# Patient Record
Sex: MALE | Race: BLACK OR AFRICAN AMERICAN | NOT HISPANIC OR LATINO | URBAN - METROPOLITAN AREA
[De-identification: names, ages, dates, MRNs, and addresses within clinical notes are randomized per-mention and may not be internally consistent; named-entity substitution may affect disease eponyms.]

---

## 2017-04-14 ENCOUNTER — EMERGENCY (EMERGENCY)
Facility: HOSPITAL | Age: 52
LOS: 1 days | Discharge: HOME | End: 2017-04-14
Attending: EMERGENCY MEDICINE | Admitting: EMERGENCY MEDICINE
Payer: OTHER MISCELLANEOUS

## 2017-04-14 VITALS
DIASTOLIC BLOOD PRESSURE: 87 MMHG | TEMPERATURE: 98 F | HEART RATE: 84 BPM | SYSTOLIC BLOOD PRESSURE: 145 MMHG | RESPIRATION RATE: 18 BRPM | OXYGEN SATURATION: 97 %

## 2017-04-14 PROCEDURE — 99284 EMERGENCY DEPT VISIT MOD MDM: CPT

## 2017-04-14 PROCEDURE — 73552 X-RAY EXAM OF FEMUR 2/>: CPT | Mod: 26,50

## 2017-04-14 PROCEDURE — 73521 X-RAY EXAM HIPS BI 2 VIEWS: CPT | Mod: 26

## 2017-04-14 PROCEDURE — 73564 X-RAY EXAM KNEE 4 OR MORE: CPT | Mod: 26,50

## 2017-04-14 NOTE — ED PROVIDER NOTE - PROGRESS NOTE DETAILS
MAXIMILIANO Mckay:(QUID PA) pt feels better ambulating using walker without difficulty.  Results reviewed with patient.  Ace wraps applied.  X-rays negative and reviewed with patient.  Discharge reviewed and discussed with patient.

## 2017-04-14 NOTE — ED PROVIDER NOTE - NS ED MD SCRIBE ATTENDING SCRIBE SECTIONS
HIV/PAST MEDICAL/SURGICAL/SOCIAL HISTORY/PHYSICAL EXAM/HISTORY OF PRESENT ILLNESS/VITAL SIGNS( Pullset)/REVIEW OF SYSTEMS/DISPOSITION

## 2017-04-14 NOTE — ED PROVIDER NOTE - PLAN OF CARE
Follow up with Orthopedics in 2-3 days.(see attached list).  Rest.  Ice 3-4 x a day.  Elevate knees. Ace wrap for support.  Use walker to ambulate.  Continue to take Motrin as prescribed by your doctor take with food.    Return to the ER for any persistent/worsening or new symptoms weakness, numbness or any concerning symptoms.

## 2017-04-14 NOTE — ED PROVIDER NOTE - OBJECTIVE STATEMENT
52 y/o M w/ no significant PMHx, presents to the ED c/o b/l knee pain and swelling. Pt states he fell last Wed (mechanical fall), brought to a hospital in [facility], dx w/ muscle strain in thighs, Rx Ibuprofen and walker w/ no relief. Pt did not get blood work or XR done. Pt notes he has been falling since he was discharged due to his knees giving out. Denies any other complaints. NKDA.

## 2017-04-14 NOTE — ED PROVIDER NOTE - CARE PLAN
Principal Discharge DX:	Knee pain, bilateral  Instructions for follow-up, activity and diet:	Follow up with Orthopedics in 2-3 days.(see attached list).  Rest.  Ice 3-4 x a day.  Elevate knees. Ace wrap for support.  Use walker to ambulate.  Continue to take Motrin as prescribed by your doctor take with food.    Return to the ER for any persistent/worsening or new symptoms weakness, numbness or any concerning symptoms.

## 2017-04-14 NOTE — ED ADULT TRIAGE NOTE - CHIEF COMPLAINT QUOTE
Pt states he fell several times (reports mechanical falls), went to another hospital and states they didn't evaluate him properly, was told he had muscle strain in his thighs, sent home with a walker and Ibuprofen Rx, now c/o swelling to both knees, with pain. Denies PMH

## 2017-07-22 ENCOUNTER — INPATIENT (INPATIENT)
Facility: HOSPITAL | Age: 52
LOS: 2 days | Discharge: HOME | DRG: 247 | End: 2017-07-25
Attending: STUDENT IN AN ORGANIZED HEALTH CARE EDUCATION/TRAINING PROGRAM | Admitting: STUDENT IN AN ORGANIZED HEALTH CARE EDUCATION/TRAINING PROGRAM
Payer: COMMERCIAL

## 2017-07-22 VITALS — WEIGHT: 220.46 LBS

## 2017-07-22 DIAGNOSIS — I21.3 ST ELEVATION (STEMI) MYOCARDIAL INFARCTION OF UNSPECIFIED SITE: ICD-10-CM

## 2017-07-22 DIAGNOSIS — F17.200 NICOTINE DEPENDENCE, UNSPECIFIED, UNCOMPLICATED: ICD-10-CM

## 2017-07-22 LAB
ALBUMIN SERPL ELPH-MCNC: 4.3 G/DL — SIGNIFICANT CHANGE UP (ref 3.3–5)
ALBUMIN SERPL ELPH-MCNC: 4.4 G/DL — SIGNIFICANT CHANGE UP (ref 3.3–5)
ALBUMIN SERPL ELPH-MCNC: 4.5 G/DL — SIGNIFICANT CHANGE UP (ref 3.3–5)
ALBUMIN SERPL ELPH-MCNC: 4.7 G/DL — SIGNIFICANT CHANGE UP (ref 3.3–5)
ALP SERPL-CCNC: 67 U/L — SIGNIFICANT CHANGE UP (ref 40–120)
ALP SERPL-CCNC: 72 U/L — SIGNIFICANT CHANGE UP (ref 40–120)
ALP SERPL-CCNC: 73 U/L — SIGNIFICANT CHANGE UP (ref 40–120)
ALP SERPL-CCNC: 75 U/L — SIGNIFICANT CHANGE UP (ref 40–120)
ALT FLD-CCNC: 25 U/L RC — SIGNIFICANT CHANGE UP (ref 10–45)
ALT FLD-CCNC: 30 U/L RC — SIGNIFICANT CHANGE UP (ref 10–45)
ALT FLD-CCNC: 40 U/L RC — SIGNIFICANT CHANGE UP (ref 10–45)
ALT FLD-CCNC: 45 U/L RC — SIGNIFICANT CHANGE UP (ref 10–45)
ANION GAP SERPL CALC-SCNC: 13 MMOL/L — SIGNIFICANT CHANGE UP (ref 5–17)
ANION GAP SERPL CALC-SCNC: 14 MMOL/L — SIGNIFICANT CHANGE UP (ref 5–17)
ANION GAP SERPL CALC-SCNC: 16 MMOL/L — SIGNIFICANT CHANGE UP (ref 5–17)
ANION GAP SERPL CALC-SCNC: 19 MMOL/L — HIGH (ref 5–17)
APTT BLD: 28.1 SEC — SIGNIFICANT CHANGE UP (ref 27.5–37.4)
AST SERPL-CCNC: 133 U/L — HIGH (ref 10–40)
AST SERPL-CCNC: 146 U/L — HIGH (ref 10–40)
AST SERPL-CCNC: 22 U/L — SIGNIFICANT CHANGE UP (ref 10–40)
AST SERPL-CCNC: 66 U/L — HIGH (ref 10–40)
BASOPHILS # BLD AUTO: 0.1 K/UL — SIGNIFICANT CHANGE UP (ref 0–0.2)
BASOPHILS NFR BLD AUTO: 0.8 % — SIGNIFICANT CHANGE UP (ref 0–2)
BILIRUB SERPL-MCNC: 0.4 MG/DL — SIGNIFICANT CHANGE UP (ref 0.2–1.2)
BILIRUB SERPL-MCNC: 0.4 MG/DL — SIGNIFICANT CHANGE UP (ref 0.2–1.2)
BILIRUB SERPL-MCNC: 0.5 MG/DL — SIGNIFICANT CHANGE UP (ref 0.2–1.2)
BILIRUB SERPL-MCNC: 0.7 MG/DL — SIGNIFICANT CHANGE UP (ref 0.2–1.2)
BLD GP AB SCN SERPL QL: NEGATIVE — SIGNIFICANT CHANGE UP
BUN SERPL-MCNC: 11 MG/DL — SIGNIFICANT CHANGE UP (ref 7–23)
BUN SERPL-MCNC: 12 MG/DL — SIGNIFICANT CHANGE UP (ref 7–23)
BUN SERPL-MCNC: 12 MG/DL — SIGNIFICANT CHANGE UP (ref 7–23)
BUN SERPL-MCNC: 13 MG/DL — SIGNIFICANT CHANGE UP (ref 7–23)
CALCIUM SERPL-MCNC: 10.2 MG/DL — SIGNIFICANT CHANGE UP (ref 8.4–10.5)
CALCIUM SERPL-MCNC: 9.6 MG/DL — SIGNIFICANT CHANGE UP (ref 8.4–10.5)
CALCIUM SERPL-MCNC: 9.8 MG/DL — SIGNIFICANT CHANGE UP (ref 8.4–10.5)
CALCIUM SERPL-MCNC: 9.8 MG/DL — SIGNIFICANT CHANGE UP (ref 8.4–10.5)
CHLORIDE SERPL-SCNC: 100 MMOL/L — SIGNIFICANT CHANGE UP (ref 96–108)
CHLORIDE SERPL-SCNC: 102 MMOL/L — SIGNIFICANT CHANGE UP (ref 96–108)
CHLORIDE SERPL-SCNC: 102 MMOL/L — SIGNIFICANT CHANGE UP (ref 96–108)
CHLORIDE SERPL-SCNC: 98 MMOL/L — SIGNIFICANT CHANGE UP (ref 96–108)
CK MB BLD-MCNC: 1.2 % — SIGNIFICANT CHANGE UP (ref 0–3.5)
CK MB BLD-MCNC: 6.9 % — HIGH (ref 0–3.5)
CK MB BLD-MCNC: 8 % — HIGH (ref 0–3.5)
CK MB BLD-MCNC: 8.4 % — HIGH (ref 0–3.5)
CK MB CFR SERPL CALC: 105.9 NG/ML — HIGH (ref 0–6.7)
CK MB CFR SERPL CALC: 139.9 NG/ML — HIGH (ref 0–6.7)
CK MB CFR SERPL CALC: 2 NG/ML — SIGNIFICANT CHANGE UP (ref 0–6.7)
CK MB CFR SERPL CALC: 74.8 NG/ML — HIGH (ref 0–6.7)
CK SERPL-CCNC: 1545 U/L — HIGH (ref 30–200)
CK SERPL-CCNC: 161 U/L — SIGNIFICANT CHANGE UP (ref 30–200)
CK SERPL-CCNC: 1663 U/L — HIGH (ref 30–200)
CK SERPL-CCNC: 932 U/L — HIGH (ref 30–200)
CO2 SERPL-SCNC: 22 MMOL/L — SIGNIFICANT CHANGE UP (ref 22–31)
CO2 SERPL-SCNC: 24 MMOL/L — SIGNIFICANT CHANGE UP (ref 22–31)
CO2 SERPL-SCNC: 25 MMOL/L — SIGNIFICANT CHANGE UP (ref 22–31)
CO2 SERPL-SCNC: 25 MMOL/L — SIGNIFICANT CHANGE UP (ref 22–31)
CREAT SERPL-MCNC: 0.72 MG/DL — SIGNIFICANT CHANGE UP (ref 0.5–1.3)
CREAT SERPL-MCNC: 0.86 MG/DL — SIGNIFICANT CHANGE UP (ref 0.5–1.3)
CREAT SERPL-MCNC: 0.86 MG/DL — SIGNIFICANT CHANGE UP (ref 0.5–1.3)
CREAT SERPL-MCNC: 0.93 MG/DL — SIGNIFICANT CHANGE UP (ref 0.5–1.3)
EOSINOPHIL # BLD AUTO: 0.2 K/UL — SIGNIFICANT CHANGE UP (ref 0–0.5)
EOSINOPHIL NFR BLD AUTO: 1 % — SIGNIFICANT CHANGE UP (ref 0–6)
GAS PNL BLDV: SIGNIFICANT CHANGE UP
GLUCOSE SERPL-MCNC: 109 MG/DL — HIGH (ref 70–99)
GLUCOSE SERPL-MCNC: 135 MG/DL — HIGH (ref 70–99)
GLUCOSE SERPL-MCNC: 149 MG/DL — HIGH (ref 70–99)
GLUCOSE SERPL-MCNC: 201 MG/DL — HIGH (ref 70–99)
HCT VFR BLD CALC: 46.2 % — SIGNIFICANT CHANGE UP (ref 39–50)
HCT VFR BLD CALC: 48.3 % — SIGNIFICANT CHANGE UP (ref 39–50)
HGB BLD-MCNC: 15.1 G/DL — SIGNIFICANT CHANGE UP (ref 13–17)
HGB BLD-MCNC: 15.9 G/DL — SIGNIFICANT CHANGE UP (ref 13–17)
INR BLD: 1.07 RATIO — SIGNIFICANT CHANGE UP (ref 0.88–1.16)
INR BLD: 1.17 RATIO — HIGH (ref 0.88–1.16)
LACTATE SERPL-SCNC: 2 MMOL/L — SIGNIFICANT CHANGE UP (ref 0.7–2)
LYMPHOCYTES # BLD AUTO: 20 % — SIGNIFICANT CHANGE UP (ref 13–44)
LYMPHOCYTES # BLD AUTO: 3.1 K/UL — SIGNIFICANT CHANGE UP (ref 1–3.3)
MAGNESIUM SERPL-MCNC: 1.8 MG/DL — SIGNIFICANT CHANGE UP (ref 1.6–2.6)
MAGNESIUM SERPL-MCNC: 2.1 MG/DL — SIGNIFICANT CHANGE UP (ref 1.6–2.6)
MAGNESIUM SERPL-MCNC: 2.2 MG/DL — SIGNIFICANT CHANGE UP (ref 1.6–2.6)
MCHC RBC-ENTMCNC: 31.4 PG — SIGNIFICANT CHANGE UP (ref 27–34)
MCHC RBC-ENTMCNC: 31.7 PG — SIGNIFICANT CHANGE UP (ref 27–34)
MCHC RBC-ENTMCNC: 32.5 GM/DL — SIGNIFICANT CHANGE UP (ref 32–36)
MCHC RBC-ENTMCNC: 33 GM/DL — SIGNIFICANT CHANGE UP (ref 32–36)
MCV RBC AUTO: 96.1 FL — SIGNIFICANT CHANGE UP (ref 80–100)
MCV RBC AUTO: 96.6 FL — SIGNIFICANT CHANGE UP (ref 80–100)
MONOCYTES # BLD AUTO: 0.8 K/UL — SIGNIFICANT CHANGE UP (ref 0–0.9)
MONOCYTES NFR BLD AUTO: 5.4 % — SIGNIFICANT CHANGE UP (ref 2–14)
NEUTROPHILS # BLD AUTO: 11.2 K/UL — HIGH (ref 1.8–7.4)
NEUTROPHILS NFR BLD AUTO: 72.9 % — SIGNIFICANT CHANGE UP (ref 43–77)
NT-PROBNP SERPL-SCNC: 10 PG/ML — SIGNIFICANT CHANGE UP (ref 0–300)
PHOSPHATE SERPL-MCNC: 2.7 MG/DL — SIGNIFICANT CHANGE UP (ref 2.5–4.5)
PLATELET # BLD AUTO: 260 K/UL — SIGNIFICANT CHANGE UP (ref 150–400)
PLATELET # BLD AUTO: 267 K/UL — SIGNIFICANT CHANGE UP (ref 150–400)
POTASSIUM SERPL-MCNC: 3.8 MMOL/L — SIGNIFICANT CHANGE UP (ref 3.5–5.3)
POTASSIUM SERPL-MCNC: 3.9 MMOL/L — SIGNIFICANT CHANGE UP (ref 3.5–5.3)
POTASSIUM SERPL-MCNC: 4.1 MMOL/L — SIGNIFICANT CHANGE UP (ref 3.5–5.3)
POTASSIUM SERPL-MCNC: 4.2 MMOL/L — SIGNIFICANT CHANGE UP (ref 3.5–5.3)
POTASSIUM SERPL-SCNC: 3.8 MMOL/L — SIGNIFICANT CHANGE UP (ref 3.5–5.3)
POTASSIUM SERPL-SCNC: 3.9 MMOL/L — SIGNIFICANT CHANGE UP (ref 3.5–5.3)
POTASSIUM SERPL-SCNC: 4.1 MMOL/L — SIGNIFICANT CHANGE UP (ref 3.5–5.3)
POTASSIUM SERPL-SCNC: 4.2 MMOL/L — SIGNIFICANT CHANGE UP (ref 3.5–5.3)
PROT SERPL-MCNC: 7.9 G/DL — SIGNIFICANT CHANGE UP (ref 6–8.3)
PROT SERPL-MCNC: 8.1 G/DL — SIGNIFICANT CHANGE UP (ref 6–8.3)
PROT SERPL-MCNC: 8.2 G/DL — SIGNIFICANT CHANGE UP (ref 6–8.3)
PROT SERPL-MCNC: 8.5 G/DL — HIGH (ref 6–8.3)
PROTHROM AB SERPL-ACNC: 11.6 SEC — SIGNIFICANT CHANGE UP (ref 9.8–12.7)
PROTHROM AB SERPL-ACNC: 12.7 SEC — SIGNIFICANT CHANGE UP (ref 9.8–12.7)
RBC # BLD: 4.79 M/UL — SIGNIFICANT CHANGE UP (ref 4.2–5.8)
RBC # BLD: 5.02 M/UL — SIGNIFICANT CHANGE UP (ref 4.2–5.8)
RBC # FLD: 13.2 % — SIGNIFICANT CHANGE UP (ref 10.3–14.5)
RBC # FLD: 13.2 % — SIGNIFICANT CHANGE UP (ref 10.3–14.5)
RH IG SCN BLD-IMP: POSITIVE — SIGNIFICANT CHANGE UP
SODIUM SERPL-SCNC: 139 MMOL/L — SIGNIFICANT CHANGE UP (ref 135–145)
SODIUM SERPL-SCNC: 140 MMOL/L — SIGNIFICANT CHANGE UP (ref 135–145)
SODIUM SERPL-SCNC: 140 MMOL/L — SIGNIFICANT CHANGE UP (ref 135–145)
SODIUM SERPL-SCNC: 141 MMOL/L — SIGNIFICANT CHANGE UP (ref 135–145)
TROPONIN T SERPL-MCNC: 0.72 NG/ML — HIGH (ref 0–0.06)
TROPONIN T SERPL-MCNC: 1.92 NG/ML — HIGH (ref 0–0.06)
TROPONIN T SERPL-MCNC: 2.5 NG/ML — HIGH (ref 0–0.06)
TROPONIN T SERPL-MCNC: <0.01 NG/ML — SIGNIFICANT CHANGE UP (ref 0–0.06)
WBC # BLD: 12.4 K/UL — HIGH (ref 3.8–10.5)
WBC # BLD: 15.4 K/UL — HIGH (ref 3.8–10.5)
WBC # FLD AUTO: 12.4 K/UL — HIGH (ref 3.8–10.5)
WBC # FLD AUTO: 15.4 K/UL — HIGH (ref 3.8–10.5)

## 2017-07-22 PROCEDURE — 99285 EMERGENCY DEPT VISIT HI MDM: CPT | Mod: 25

## 2017-07-22 PROCEDURE — 93306 TTE W/DOPPLER COMPLETE: CPT | Mod: 26

## 2017-07-22 PROCEDURE — 71010: CPT | Mod: 26

## 2017-07-22 PROCEDURE — 93010 ELECTROCARDIOGRAM REPORT: CPT

## 2017-07-22 PROCEDURE — 99291 CRITICAL CARE FIRST HOUR: CPT

## 2017-07-22 RX ORDER — METOPROLOL TARTRATE 50 MG
25 TABLET ORAL
Qty: 0 | Refills: 0 | Status: DISCONTINUED | OUTPATIENT
Start: 2017-07-22 | End: 2017-07-23

## 2017-07-22 RX ORDER — SIMETHICONE 80 MG/1
80 TABLET, CHEWABLE ORAL ONCE
Qty: 0 | Refills: 0 | Status: COMPLETED | OUTPATIENT
Start: 2017-07-22 | End: 2017-07-22

## 2017-07-22 RX ORDER — MORPHINE SULFATE 50 MG/1
4 CAPSULE, EXTENDED RELEASE ORAL ONCE
Qty: 0 | Refills: 0 | Status: DISCONTINUED | OUTPATIENT
Start: 2017-07-22 | End: 2017-07-22

## 2017-07-22 RX ORDER — LIDOCAINE HCL 20 MG/ML
1 VIAL (ML) INJECTION
Qty: 2 | Refills: 0 | Status: DISCONTINUED | OUTPATIENT
Start: 2017-07-22 | End: 2017-07-22

## 2017-07-22 RX ORDER — POTASSIUM CHLORIDE 20 MEQ
10 PACKET (EA) ORAL ONCE
Qty: 0 | Refills: 0 | Status: COMPLETED | OUTPATIENT
Start: 2017-07-22 | End: 2017-07-22

## 2017-07-22 RX ORDER — TICAGRELOR 90 MG/1
90 TABLET ORAL
Qty: 0 | Refills: 0 | Status: DISCONTINUED | OUTPATIENT
Start: 2017-07-22 | End: 2017-07-25

## 2017-07-22 RX ORDER — ASPIRIN/CALCIUM CARB/MAGNESIUM 324 MG
162 TABLET ORAL ONCE
Qty: 0 | Refills: 0 | Status: COMPLETED | OUTPATIENT
Start: 2017-07-22 | End: 2017-07-22

## 2017-07-22 RX ORDER — MAGNESIUM SULFATE 500 MG/ML
1 VIAL (ML) INJECTION ONCE
Qty: 0 | Refills: 0 | Status: COMPLETED | OUTPATIENT
Start: 2017-07-22 | End: 2017-07-22

## 2017-07-22 RX ORDER — ATORVASTATIN CALCIUM 80 MG/1
80 TABLET, FILM COATED ORAL AT BEDTIME
Qty: 0 | Refills: 0 | Status: DISCONTINUED | OUTPATIENT
Start: 2017-07-22 | End: 2017-07-25

## 2017-07-22 RX ORDER — TICAGRELOR 90 MG/1
180 TABLET ORAL ONCE
Qty: 0 | Refills: 0 | Status: COMPLETED | OUTPATIENT
Start: 2017-07-22 | End: 2017-07-22

## 2017-07-22 RX ORDER — HEPARIN SODIUM 5000 [USP'U]/ML
4000 INJECTION INTRAVENOUS; SUBCUTANEOUS ONCE
Qty: 0 | Refills: 0 | Status: COMPLETED | OUTPATIENT
Start: 2017-07-22 | End: 2017-07-22

## 2017-07-22 RX ORDER — METOPROLOL TARTRATE 50 MG
25 TABLET ORAL ONCE
Qty: 0 | Refills: 0 | Status: COMPLETED | OUTPATIENT
Start: 2017-07-22 | End: 2017-07-22

## 2017-07-22 RX ORDER — NITROGLYCERIN 6.5 MG
0.4 CAPSULE, EXTENDED RELEASE ORAL ONCE
Qty: 0 | Refills: 0 | Status: COMPLETED | OUTPATIENT
Start: 2017-07-22 | End: 2017-07-22

## 2017-07-22 RX ORDER — CAPTOPRIL 12.5 MG/1
6.25 TABLET ORAL DAILY
Qty: 0 | Refills: 0 | Status: DISCONTINUED | OUTPATIENT
Start: 2017-07-22 | End: 2017-07-22

## 2017-07-22 RX ORDER — SODIUM CHLORIDE 9 MG/ML
3 INJECTION INTRAMUSCULAR; INTRAVENOUS; SUBCUTANEOUS ONCE
Qty: 0 | Refills: 0 | Status: COMPLETED | OUTPATIENT
Start: 2017-07-22 | End: 2017-07-22

## 2017-07-22 RX ORDER — LISINOPRIL 2.5 MG/1
5 TABLET ORAL DAILY
Qty: 0 | Refills: 0 | Status: DISCONTINUED | OUTPATIENT
Start: 2017-07-23 | End: 2017-07-25

## 2017-07-22 RX ORDER — HEPARIN SODIUM 5000 [USP'U]/ML
INJECTION INTRAVENOUS; SUBCUTANEOUS
Qty: 25000 | Refills: 0 | Status: DISCONTINUED | OUTPATIENT
Start: 2017-07-22 | End: 2017-07-22

## 2017-07-22 RX ORDER — FUROSEMIDE 40 MG
40 TABLET ORAL ONCE
Qty: 0 | Refills: 0 | Status: COMPLETED | OUTPATIENT
Start: 2017-07-22 | End: 2017-07-22

## 2017-07-22 RX ORDER — ASPIRIN/CALCIUM CARB/MAGNESIUM 324 MG
81 TABLET ORAL DAILY
Qty: 0 | Refills: 0 | Status: DISCONTINUED | OUTPATIENT
Start: 2017-07-22 | End: 2017-07-25

## 2017-07-22 RX ORDER — POTASSIUM CHLORIDE 20 MEQ
20 PACKET (EA) ORAL ONCE
Qty: 0 | Refills: 0 | Status: COMPLETED | OUTPATIENT
Start: 2017-07-22 | End: 2017-07-22

## 2017-07-22 RX ORDER — HEPARIN SODIUM 5000 [USP'U]/ML
4000 INJECTION INTRAVENOUS; SUBCUTANEOUS EVERY 6 HOURS
Qty: 0 | Refills: 0 | Status: DISCONTINUED | OUTPATIENT
Start: 2017-07-22 | End: 2017-07-22

## 2017-07-22 RX ADMIN — Medication 25 MILLIGRAM(S): at 10:17

## 2017-07-22 RX ADMIN — ATORVASTATIN CALCIUM 80 MILLIGRAM(S): 80 TABLET, FILM COATED ORAL at 21:59

## 2017-07-22 RX ADMIN — Medication 7.5 MG/MIN: at 06:15

## 2017-07-22 RX ADMIN — ATORVASTATIN CALCIUM 80 MILLIGRAM(S): 80 TABLET, FILM COATED ORAL at 03:48

## 2017-07-22 RX ADMIN — CAPTOPRIL 6.25 MILLIGRAM(S): 12.5 TABLET ORAL at 05:14

## 2017-07-22 RX ADMIN — Medication 81 MILLIGRAM(S): at 10:17

## 2017-07-22 RX ADMIN — HEPARIN SODIUM 4000 UNIT(S): 5000 INJECTION INTRAVENOUS; SUBCUTANEOUS at 01:15

## 2017-07-22 RX ADMIN — Medication 10 MILLIEQUIVALENT(S): at 12:45

## 2017-07-22 RX ADMIN — Medication 0.4 MILLIGRAM(S): at 01:04

## 2017-07-22 RX ADMIN — TICAGRELOR 90 MILLIGRAM(S): 90 TABLET ORAL at 05:14

## 2017-07-22 RX ADMIN — SIMETHICONE 80 MILLIGRAM(S): 80 TABLET, CHEWABLE ORAL at 19:21

## 2017-07-22 RX ADMIN — Medication 100 GRAM(S): at 05:48

## 2017-07-22 RX ADMIN — Medication 162 MILLIGRAM(S): at 01:02

## 2017-07-22 RX ADMIN — Medication 20 MILLIEQUIVALENT(S): at 12:45

## 2017-07-22 RX ADMIN — SODIUM CHLORIDE 3 MILLILITER(S): 9 INJECTION INTRAMUSCULAR; INTRAVENOUS; SUBCUTANEOUS at 00:59

## 2017-07-22 RX ADMIN — TICAGRELOR 90 MILLIGRAM(S): 90 TABLET ORAL at 18:28

## 2017-07-22 RX ADMIN — TICAGRELOR 180 MILLIGRAM(S): 90 TABLET ORAL at 01:03

## 2017-07-22 RX ADMIN — MORPHINE SULFATE 4 MILLIGRAM(S): 50 CAPSULE, EXTENDED RELEASE ORAL at 01:02

## 2017-07-22 RX ADMIN — Medication 25 MILLIGRAM(S): at 18:28

## 2017-07-22 RX ADMIN — Medication 40 MILLIGRAM(S): at 05:53

## 2017-07-22 NOTE — ED PROVIDER NOTE - OBJECTIVE STATEMENT
53yo M with crushing CP started prior to going to sleep 10/10 nonradiating, +nausea, no diaphoresis. no cardiac history. former smoker. improved with 2NO spray per EMS now worse again. no SOB. worse with lying down.     PMH- COPD

## 2017-07-22 NOTE — ED PROVIDER NOTE - PHYSICAL EXAMINATION
Gen: uncomfortable sitting up in bed, AOx3  Head: NCAT  HEENT: PERRL, oral mucosa moist, normal conjunctiva, neck supple  Lung: CTAB, no respiratory distress  CV: ruddy, no murmur, Normal perfusion  Abd: soft, NTND  MSK: No edema, no visible deformities  Neuro: No focal neurologic deficits  Skin: No rash, clammy  Psych: normal affect

## 2017-07-22 NOTE — H&P ADULT - HISTORY OF PRESENT ILLNESS
Patient is a 52 year old male active smoker presents to ED with chest pain. Patient stated pain started at 1045PM after walking up the stairs to his room. Patient stated the pain was 9/10, crushing in quality, mid sternal, non radiating, not positional on onset. Patient also complained of nausea and diaphresis. Patient called EMS, received 2 NTG tablets on the way to the ED which relieved his symptoms    At the ER, patient got an EKG which showed JIMMY in V1 and V2, STD in lateral leads and tall peaked T waves in the percoridal leads concerning for deWinter's T waves. Patient ASA Brillinta loaded, started on heparin and sent for an emergent cath. At the cath lab, patient got 2 BRADEN to the pLAD. pRCA was 100% occluded but had good collaterals. Patient also had diffuse 70% mCx. LVEDP was 43, patient recieved lasix 40IVP. Patient sent to CCU for further monitering.

## 2017-07-22 NOTE — H&P ADULT - PROBLEM SELECTOR PLAN 1
- c/w ASA, Brillinta, Captopril, Lipitor  - Will add BB as tolerated  - LVEDP 43. s/p 40IVP lasix in lab, will mointer I/Os  - TTE in 72 hours

## 2017-07-22 NOTE — CHART NOTE - NSCHARTNOTEFT_GEN_A_CORE
====================  CCU MIDNIGHT ROUNDS  ====================    PINO LEE  [id number]    ====================  SUMMARY:  ====================    53 yo M smoker, AWSTEMI s/p BRADEN x 2 mLAD 7/22, EDP 30s s/p IV lasix     ====================  NEW EVENTS:  ====================    no CP/SOB/palpitations.    ====================  VITALS (Last 12 hrs):  ====================    T(C): 36.7 (07-22-17 @ 19:25), Max: 36.7 (07-22-17 @ 17:19)  HR: 76 (07-22-17 @ 20:25) (70 - 80)  BP: 107/71 (07-22-17 @ 20:25) (107/71 - 152/84)  BP(mean): 90 (07-22-17 @ 20:25) (90 - 120)  RR: 21 (07-22-17 @ 20:25) (15 - 27)  SpO2: 98% (07-22-17 @ 20:25) (94% - 99%)    TELEMETRY:    I&O's Summary    21 Jul 2017 07:01  -  22 Jul 2017 07:00  --------------------------------------------------------  IN: 100 mL / OUT: 1525 mL / NET: -1425 mL    22 Jul 2017 07:01  -  22 Jul 2017 21:54  --------------------------------------------------------  IN: 460 mL / OUT: 1500 mL / NET: -1040 mL    ====================  PLAN:  ====================  1. AWSTEMI - DAPT, lipitor, BB, ACE, uptitrate BB/ACE as tolerated, TTE @ 72h, enzymes peaked, OOB w/ assistance     Genny Day CCU NP   #[id number]

## 2017-07-22 NOTE — ED PROVIDER NOTE - NS ED ROS FT
ROS: +CP no SOB. no cough. no v/d/c. no abd pain. no rash. no bleeding. no urinary complaints. no weakness. no vision changes. no HA. no neck/back pain. no extremity swelling.

## 2017-07-22 NOTE — H&P ADULT - FAMILY HISTORY
Grandparent  Still living? Unknown  Family history of acute myocardial infarction, Age at diagnosis: Age Unknown

## 2017-07-22 NOTE — H&P ADULT - NSHPPHYSICALEXAM_GEN_ALL_CORE
Appearance: Normal, NAD  Eyes: PERRL, EOMI  HENT: Normal oral muscosa, NC/AT  Cardiovascular:  S1/S2, RRR, No edema, JVP, Murmur  Procedural Access Site (R groin): No hematoma, Non-tender to palpation, 2+ pulse, No bruit, No Ecchymosis  Respiratory: Clear to auscultation bilaterally  Gastrointestinal:  Soft, Non-tender, Non-distended, BS+  Musculoskeletal:  No clubbing [ ] No joint deformity   Neurologic: Non-focal  Lymphatic: No lymphadenopathy  Psychiatry: AAOx3, Mood & affect appropriate  Skin: No rashes, No ecchymoses, No cyanosis

## 2017-07-22 NOTE — H&P ADULT - NSHPREVIEWOFSYSTEMS_GEN_ALL_CORE
Constitutional: [ ] Fever, [ ] Chills, [ ] Fatigue, [ ]Weight change   HEENT: [ ]Blurred vision,  [ ]Headache  Respiratory: [ ]Cough, [ ]Wheezing, [ ] Shortness of breath  Cardiovascular: [ ]Chest Pain, [ ]Palpitations, [ ]HUNT, [ ]PND, [ ] Orthopnea  Gastrointestinal: [ ]Abdominal Pain, [x]Diarrhea, [ ]Constipation, [ ] Nausea, [ ] Vomiting  Extremities: [ ]Swelling, [x] Joint Pain, Knees  Neurologic: [ ]Focal deficit, [ ]Paresthesias, [ ]Syncope  Skin: [ ]Rash, [ ]Ecchymoses, [ ] Wounds, [ ]Lesions

## 2017-07-22 NOTE — ED PROVIDER NOTE - MEDICAL DECISION MAKING DETAILS
Attending Note (Amanda): patient with stemi.  appears uncomfortable.  chest pain.  cath consult called emergently.  acs protocol started.

## 2017-07-22 NOTE — ED ADULT NURSE NOTE - OBJECTIVE STATEMENT
51 y/o M, A&Ox3, enters ED by EMS w/ c/o chest pain. EKG done - pt. placed on CM. EKG shows anterior wall STEMI. Pt. presents w/ 20 g IV placed by EMS - flushes w/o difficulty. 18 g on right AC established upon entrance to ED. Pt. took 2 baby aspirin prior to arrival in ED. Pt. reports he was going to bed and felt sudden chest pain, which pt. described as "tearing." Pain scale 10/10. Pt. leaning over in pain. Deep, fast resp. r/t pain. Hx. of COPD, knee surgery- no cardiac hx. Skin warm, dry & intact. Family at bedside.

## 2017-07-22 NOTE — CONSULT NOTE ADULT - ATTENDING COMMENTS
Patient seen/examined at bedside. Agree with above with the following changes.    52 year old male, smoker, with AWSTEMI, s/p BRADEN x 2 to LAD, now in CCU.  -DAPT, statin; BP elevated, will start Lopressor 25 bid for ectopy (d/c lidocaine); both AIVR and NSVT are noted on telemetry. Watch electrolytes; keep K>4, Mg>2  -Lisinopril started, watching creatinine post cath  -TTE to evaluate LV function  -TSH, Hba1c, lipid panel  -Trending CE    Patient at high risk for decompensation. >35 minutes of critical care time was spent with this patient..

## 2017-07-22 NOTE — H&P ADULT - ASSESSMENT
Pt is a 52 year old male active smoker presented to the ED w/ STEMI s/p PCI DESx2 to 100% pLAD, 100% RCA w/ good collateralis 70% diffuse Cx

## 2017-07-22 NOTE — CONSULT NOTE ADULT - SUBJECTIVE AND OBJECTIVE BOX
Patient seen and evaluated @ [facility] ED  Chief Complaint: chest pain    HPI:  Patient is a 52 year old male active smoker presents to ED with chest pain. Patient stated pain started at 1045PM after walking up the stairs to his room. Patient stated the pain was 9/10, crushing in quality, mid sternal, non radiating, not positional on onset. Patient also complained of nausea and diaphresis. Patient called EMS, received 2 NTG tablets on the way to the ED which relieved his symptoms    At the ER, patient got an EKG which showed JIMMY in V1 and V2, STD in lateral leads and tall peaked T waves in the percoridal leads concerning for deWinter's T waves. Patient ASA Brillinta loaded, started on heparin and sent for an emergent cath. At the cath lab, patient got 2 BRADEN to the pLAD. pRCA was 100% occluded but had good collaterals. Patient also had diffuse 70% mCx. LVEDP was 43, patient recieved lasix 40IVP. Patient sent to CCU for further monitering. (22 Jul 2017 04:03)    PMH:   No pertinent past medical history    PSH:   No significant past surgical history    Medications:   heparin  Infusion.  Unit(s)/Hr IV Continuous <Continuous>  heparin  Injectable 4000 Unit(s) IV Push every 6 hours PRN  ticagrelor 90 milliGRAM(s) Oral two times a day  atorvastatin 80 milliGRAM(s) Oral at bedtime  aspirin enteric coated 81 milliGRAM(s) Oral daily  captopril 6.25 milliGRAM(s) Oral daily  lidocaine   Infusion 1 mG/Min IV Continuous <Continuous>    Allergies:  No Known Allergies    FAMILY HISTORY:  Family history of acute myocardial infarction (Grandparent)    Social History:  Smoking: "on and off" smoker 40 yrs  Alcohol: denies  Drugs: occasional marijuana    Review of Systems: see hpi  Constitutional: [ ] Fever [ ] Chills [ ] Fatigue [ ] Weight change   HEENT: [ ] Blurred vision [ ] Eye Pain [ ] Headache [ ] Runny nose [ ] Sore Throat   Respiratory: [ ] Cough [ ] Wheezing [ ] Shortness of breath  Cardiovascular: [ ] Chest Pain [ ] Palpitations [ ] HUNT [ ] PND [ ] Orthopnea  Gastrointestinal: [ ] Abdominal Pain [ ] Diarrhea [ ] Constipation [ ] Hemorrhoids [ ] Nausea [ ] Vomiting  Genitourinary: [ ] Nocturia [ ] Dysuria [ ] Incontinence  Extremities: [ ] Swelling [ ] Joint Pain  Neurologic: [ ] Focal deficit [ ] Paresthesias [ ] Syncope  Lymphatic: [ ] Swelling [ ] Lymphadenopathy   Skin: [ ] Rash [ ] Ecchymoses [ ] Wounds [ ] Lesions  Psychiatry: [ ] Depression [ ] Suicidal/Homicidal Ideation [ ] Anxiety [ ] Sleep Disturbances  [ ] 10 point review of systems is otherwise negative except as mentioned above            [ ]Unable to obtain    Physical Exam:  T(C): 36.6 (07-22-17 @ 02:37), Max: 36.6 (07-22-17 @ 02:37)  HR: 79 (07-22-17 @ 06:16) (64 - 79)  BP: 106/77 (07-22-17 @ 06:16) (106/77 - 148/81)  RR: 18 (07-22-17 @ 06:16) (18 - 20)  SpO2: 100% (07-22-17 @ 06:16) (95% - 100%)  Wt(kg): --    07-21 @ 07:01  -  07-22 @ 07:00  --------------------------------------------------------  IN: 100 mL / OUT: 1125 mL / NET: -1025 mL      Daily Height in cm: 182.88 (22 Jul 2017 02:37)    Daily     Appearance: [x ] Normal, significant pain and discomfort  Eyes: [x ] PERRL [x ] EOMI  HENT: [x ] Normal oral muscosa [ x]NC/AT  Cardiovascular: x[ ] S1 [ x] S2 [x ] RRR [ x] No m/r/g [x ]No edema [x ] no JVP  Respiratory: [x ] Clear to auscultation bilaterally  Gastrointestinal: [x ] Soft [x ] Non-tender x[ ] Non-distended [ ]x BS+  Musculoskeletal: [x ] No clubbing [ x] No joint deformity   Neurologic: [x ] Non-focal  Lymphatic: [x ] No lymphadenopathy  Psychiatry: [x ] AAOx3 [x ] Mood & affect appropriate  Skin: [x ] No rashes [x] No ecchymoses [ x] No cyanosis    Cardiovascular Diagnostic Testing:  ECG: sinus 69, anterior jimmy 2mm J point elevation, lateral DeWinter sign, inf std downsloping 2mm    Cath:  < from: Cardiac Cath Lab - Adult (07.22.17 @ 01:30) >  PROCEDURE:  --  Left heart catheterization with ventriculography.  --  Left coronary angiography.  --  Right coronary angiography.  --  Sonosite - Diagnostic.  --  Intervention on proximal LAD: drug-eluting stent.    < end of copied text >  < from: Cardiac Cath Lab - Adult (07.22.17 @ 01:30) >  HEMODYNAMICS: Hemodynamic assessment demonstrates moderately to severely  elevated LVEDP.  VENTRICLES: Analysis of regional contractile function demonstrated severe  anterolateral hypokinesis, severe apical hypokinesis, and severe  diaphragmatic hypokinesis. EF estimated was 35 %.  CORONARY VESSELS: The coronary circulation is right dominant.  LM:   --  LM: Angiography showed moderate atherosclerosis.  LAD:   --  Proximal LAD: There was a diffuse 100 % stenosis. The lesion was  associated with a large filling defect consistent with thrombus. There was  MEGAN grade 0 flow through the vessel (no flow) and a large vascular  territory distal to the lesion. This lesion is a likely culprit for the  patient's recent myocardial infarction. It appears amenable to  percutaneous intervention.  CX:   --  Mid circumflex: There was a diffuse 70 % stenosis.  RCA:   --  Proximal RCA: There was a 100 % stenosis. There was good  collateral blood supply to the distal myocardium.  COMPLICATIONS: There were no complications.  SUMMARY:  HEMODYNAMICS: Hemodynamic assessment demonstrates moderately to severely  elevated LVEDP.  DIAGNOSTIC IMPRESSIONS: There is significant triple vessel coronary artery  disease.  DIAGNOSTIC RECOMMENDATIONS: Proceed with primary PCI of the proximal LAD in  context of an acute anterior wall STEMI. The LCX is diffusely diseased and  the RCA is chronically occluded and collateralized.  INTERVENTIONAL RECOMMENDATIONS: Post MI/PCI management in the CCU; continue  aspirin and Brilinta for 1 year post MI; maximize statin therapy (LDL goal  <70); start a low dose aspirin and ACE as hemodynamics allow; TTE with  echo contrast to evaluate for LV apical thrombus; EPS evaluation as  indicated.    < end of copied text >      Interpretation of Telemetry:    Imaging:    Labs:                        15.9   12.4  )-----------( 267      ( 22 Jul 2017 05:14 )             48.3     07-22    140  |  102  |  11  ----------------------------<  149<H>  4.1   |  25  |  0.86    Ca    9.8      22 Jul 2017 05:14  Phos  2.7     07-22  Mg     1.8     07-22    TPro  8.2  /  Alb  4.5  /  TBili  0.4  /  DBili  x   /  AST  66<H>  /  ALT  30  /  AlkPhos  72  07-22    PT/INR - ( 22 Jul 2017 05:15 )   PT: 12.7 sec;   INR: 1.17 ratio         PTT - ( 22 Jul 2017 01:02 )  PTT:28.1 sec  CARDIAC MARKERS ( 22 Jul 2017 05:15 )  x     / 0.72 ng/mL / 932 U/L / x     / 74.8 ng/mL  CARDIAC MARKERS ( 22 Jul 2017 01:01 )  x     / <0.01 ng/mL / 161 U/L / x     / 2.0 ng/mL      Serum Pro-Brain Natriuretic Peptide: 10 pg/mL (07-22 @ 01:01)

## 2017-07-23 ENCOUNTER — TRANSCRIPTION ENCOUNTER (OUTPATIENT)
Age: 52
End: 2017-07-23

## 2017-07-23 LAB
ALBUMIN SERPL ELPH-MCNC: 4.1 G/DL — SIGNIFICANT CHANGE UP (ref 3.3–5)
ALP SERPL-CCNC: 72 U/L — SIGNIFICANT CHANGE UP (ref 40–120)
ALT FLD-CCNC: 41 U/L RC — SIGNIFICANT CHANGE UP (ref 10–45)
ANION GAP SERPL CALC-SCNC: 15 MMOL/L — SIGNIFICANT CHANGE UP (ref 5–17)
AST SERPL-CCNC: 102 U/L — HIGH (ref 10–40)
BILIRUB SERPL-MCNC: 0.9 MG/DL — SIGNIFICANT CHANGE UP (ref 0.2–1.2)
BUN SERPL-MCNC: 13 MG/DL — SIGNIFICANT CHANGE UP (ref 7–23)
CALCIUM SERPL-MCNC: 9.7 MG/DL — SIGNIFICANT CHANGE UP (ref 8.4–10.5)
CHLORIDE SERPL-SCNC: 101 MMOL/L — SIGNIFICANT CHANGE UP (ref 96–108)
CHOLEST SERPL-MCNC: 207 MG/DL — HIGH (ref 10–199)
CO2 SERPL-SCNC: 23 MMOL/L — SIGNIFICANT CHANGE UP (ref 22–31)
CREAT SERPL-MCNC: 0.82 MG/DL — SIGNIFICANT CHANGE UP (ref 0.5–1.3)
GLUCOSE SERPL-MCNC: 111 MG/DL — HIGH (ref 70–99)
HBA1C BLD-MCNC: 5.8 % — HIGH (ref 4–5.6)
HCT VFR BLD CALC: 50.1 % — HIGH (ref 39–50)
HDLC SERPL-MCNC: 39 MG/DL — LOW (ref 40–125)
HGB BLD-MCNC: 16.7 G/DL — SIGNIFICANT CHANGE UP (ref 13–17)
LIPID PNL WITH DIRECT LDL SERPL: 144 MG/DL — HIGH
MAGNESIUM SERPL-MCNC: 2 MG/DL — SIGNIFICANT CHANGE UP (ref 1.6–2.6)
MCHC RBC-ENTMCNC: 31.8 PG — SIGNIFICANT CHANGE UP (ref 27–34)
MCHC RBC-ENTMCNC: 33.3 GM/DL — SIGNIFICANT CHANGE UP (ref 32–36)
MCV RBC AUTO: 95.6 FL — SIGNIFICANT CHANGE UP (ref 80–100)
PHOSPHATE SERPL-MCNC: 4.1 MG/DL — SIGNIFICANT CHANGE UP (ref 2.5–4.5)
PLATELET # BLD AUTO: 241 K/UL — SIGNIFICANT CHANGE UP (ref 150–400)
POTASSIUM SERPL-MCNC: 4.4 MMOL/L — SIGNIFICANT CHANGE UP (ref 3.5–5.3)
POTASSIUM SERPL-SCNC: 4.4 MMOL/L — SIGNIFICANT CHANGE UP (ref 3.5–5.3)
PROT SERPL-MCNC: 7.9 G/DL — SIGNIFICANT CHANGE UP (ref 6–8.3)
RBC # BLD: 5.25 M/UL — SIGNIFICANT CHANGE UP (ref 4.2–5.8)
RBC # FLD: 13.3 % — SIGNIFICANT CHANGE UP (ref 10.3–14.5)
SODIUM SERPL-SCNC: 139 MMOL/L — SIGNIFICANT CHANGE UP (ref 135–145)
TOTAL CHOLESTEROL/HDL RATIO MEASUREMENT: 5.3 RATIO — SIGNIFICANT CHANGE UP (ref 3.4–9.6)
TRIGL SERPL-MCNC: 122 MG/DL — SIGNIFICANT CHANGE UP (ref 10–149)
WBC # BLD: 11.5 K/UL — HIGH (ref 3.8–10.5)
WBC # FLD AUTO: 11.5 K/UL — HIGH (ref 3.8–10.5)

## 2017-07-23 PROCEDURE — 93010 ELECTROCARDIOGRAM REPORT: CPT | Mod: 77

## 2017-07-23 PROCEDURE — 93010 ELECTROCARDIOGRAM REPORT: CPT

## 2017-07-23 PROCEDURE — 99291 CRITICAL CARE FIRST HOUR: CPT

## 2017-07-23 RX ORDER — METOPROLOL TARTRATE 50 MG
50 TABLET ORAL DAILY
Qty: 0 | Refills: 0 | Status: DISCONTINUED | OUTPATIENT
Start: 2017-07-24 | End: 2017-07-25

## 2017-07-23 RX ORDER — HEPARIN SODIUM 5000 [USP'U]/ML
5000 INJECTION INTRAVENOUS; SUBCUTANEOUS EVERY 8 HOURS
Qty: 0 | Refills: 0 | Status: DISCONTINUED | OUTPATIENT
Start: 2017-07-23 | End: 2017-07-25

## 2017-07-23 RX ORDER — METOPROLOL TARTRATE 50 MG
25 TABLET ORAL ONCE
Qty: 0 | Refills: 0 | Status: COMPLETED | OUTPATIENT
Start: 2017-07-23 | End: 2017-07-23

## 2017-07-23 RX ADMIN — Medication 25 MILLIGRAM(S): at 17:38

## 2017-07-23 RX ADMIN — Medication 81 MILLIGRAM(S): at 12:17

## 2017-07-23 RX ADMIN — TICAGRELOR 90 MILLIGRAM(S): 90 TABLET ORAL at 17:38

## 2017-07-23 RX ADMIN — Medication 25 MILLIGRAM(S): at 05:05

## 2017-07-23 RX ADMIN — HEPARIN SODIUM 5000 UNIT(S): 5000 INJECTION INTRAVENOUS; SUBCUTANEOUS at 21:28

## 2017-07-23 RX ADMIN — ATORVASTATIN CALCIUM 80 MILLIGRAM(S): 80 TABLET, FILM COATED ORAL at 21:28

## 2017-07-23 RX ADMIN — HEPARIN SODIUM 5000 UNIT(S): 5000 INJECTION INTRAVENOUS; SUBCUTANEOUS at 13:56

## 2017-07-23 RX ADMIN — TICAGRELOR 90 MILLIGRAM(S): 90 TABLET ORAL at 05:05

## 2017-07-23 RX ADMIN — HEPARIN SODIUM 5000 UNIT(S): 5000 INJECTION INTRAVENOUS; SUBCUTANEOUS at 05:05

## 2017-07-23 RX ADMIN — LISINOPRIL 5 MILLIGRAM(S): 2.5 TABLET ORAL at 05:05

## 2017-07-23 NOTE — DISCHARGE NOTE ADULT - ADDITIONAL INSTRUCTIONS
No heavy lifting, strenuous activity, bending, straining, or unnecessary stair climbing for 2 weeks. No driving for 2 days. You may shower 24 hours following the procedure but avoid baths/swimming for 1 week. Check your groin site for bleeding and/or swelling daily following procedure and call your doctor immediately if it occurs or if you experience increased pain at the site. Follow up with your cardiologist in 1-2 weeks. You may call [facility] if you have any questions/concerns regarding your procedure [phone]. [facility].  No heavy lifting, strenuous activity, bending, straining, or unnecessary stair climbing for 2 weeks. No driving for 2 days. You may shower 24 hours following the procedure but avoid baths/swimming for 1 week. Check your groin site for bleeding and/or swelling daily following procedure and call your doctor immediately if it occurs or if you experience increased pain at the site. Follow up with your cardiologist in 1-2 weeks. You may call [facility] if you have any questions/concerns regarding your procedure [phone].

## 2017-07-23 NOTE — DISCHARGE NOTE ADULT - CARE PROVIDER_API CALL
Adarsh Moon), Internal Medicine  [address] [place] [address]  Phone: [phone]  Fax: [phone] Adarsh Moon), Internal Medicine  [address] [place] [address]  Phone: [phone]  Fax: [phone]    Oleg Reyes), Cardiac Electrophysiology; Cardiology; Internal Medicine  [address] [place] [address]  Phone: [phone]  Fax: [phone]

## 2017-07-23 NOTE — DISCHARGE NOTE ADULT - INSTRUCTIONS
1800 calorie diet/ low salt, low fat , low cholesterol   Continue your medications. Do not stop Aspirin or Brilinta unless instructed by your cardiologist.  No heavy lifting, strenuous activity, bending, straining or unnecessary stair climbing for 2 weeks. No sex for 1 week.  No driving for 2 days. You may shower 24 hours following procedure but avoid baths and swimming for 1 week. Check groin site for bleeding and/or swelling daily following procedure. Call your doctor/cardiologist immediately for bleeding or swelling or if you have increased/persistent pain, fever/chills, or drainage at the site. Follow up with your cardiologist in 1- 2 weeks. You may call [facility] at [phone] or [phone] after office hours and weekends with any questions or concerns following your procedure. Take medicines as prescribed, follow up with your doctors as advised, please wear lifevest all the time except for shower.

## 2017-07-23 NOTE — DISCHARGE NOTE ADULT - PLAN OF CARE
Coronary artery disease is a condition where the arteries the supply the heart muscle get clogges with fatty deposits & puts you at risk for a heart attack  Call your doctor if you have any new pain, pressure, or discomfort in the center of your chest, pain, tingling or discomfort in arms, back, neck, jaw, or stomach, shortness of breath, nausea, vomiting, burping or heartburn, sweating, cold and clammy skin, racing or abnormal heartbeat for more than 10 minutes or if they keep coming & going.  Call 911 and do not tr to get to hospital by care  You can help yourself with lefestyle changes (quitting smoking if you smoke), eat lots of fruits & vegetables & low fat dairy products, not a lot of meat & fatty foods, walk or some form of physical activity most days of the week, lose weight if you are overweight  Take your cardiac medication as prescribed to lower cholesterol, to lower blood pressure, aspirin to prevent blood clots, and diabetes control  Make sure to keep appointments with doctor for cardiac follow up care Low salt, low fat diet.   Weight management.   Take medications as prescribed.    No smoking.  Follow up appointments with your doctor(s)  as instructed.

## 2017-07-23 NOTE — DISCHARGE NOTE ADULT - CARE PLAN
Principal Discharge DX:	ST elevation myocardial infarction involving left anterior descending (LAD) coronary artery  Goal:	Coronary artery disease is a condition where the arteries the supply the heart muscle get clogges with fatty deposits & puts you at risk for a heart attack  Call your doctor if you have any new pain, pressure, or discomfort in the center of your chest, pain, tingling or discomfort in arms, back, neck, jaw, or stomach, shortness of breath, nausea, vomiting, burping or heartburn, sweating, cold and clammy skin, racing or abnormal heartbeat for more than 10 minutes or if they keep coming & going.  Call 911 and do not tr to get to hospital by care  You can help yourself with lefestyle changes (quitting smoking if you smoke), eat lots of fruits & vegetables & low fat dairy products, not a lot of meat & fatty foods, walk or some form of physical activity most days of the week, lose weight if you are overweight  Take your cardiac medication as prescribed to lower cholesterol, to lower blood pressure, aspirin to prevent blood clots, and diabetes control  Make sure to keep appointments with doctor for cardiac follow up care  Instructions for follow-up, activity and diet:	Low salt, low fat diet.   Weight management.   Take medications as prescribed.    No smoking.  Follow up appointments with your doctor(s)  as instructed.

## 2017-07-23 NOTE — DISCHARGE NOTE ADULT - MEDICATION SUMMARY - MEDICATIONS TO TAKE
I will START or STAY ON the medications listed below when I get home from the hospital:    aspirin 81 mg oral tablet  -- 1 tab(s) by mouth once a day  -- Indication: For STEMI (ST elevation myocardial infarction)    lisinopril 5 mg oral tablet  -- 1 tab(s) by mouth once a day  -- Indication: For STEMI (ST elevation myocardial infarction)    atorvastatin 80 mg oral tablet  -- 1 tab(s) by mouth once a day (at bedtime)  -- Indication: For STEMI (ST elevation myocardial infarction)    ticagrelor 90 mg oral tablet  -- 1 tab(s) by mouth 2 times a day  -- Indication: For STEMI (ST elevation myocardial infarction)    metoprolol succinate 50 mg oral tablet, extended release  -- 1 tab(s) by mouth once a day  -- Indication: For STEMI (ST elevation myocardial infarction)

## 2017-07-23 NOTE — DISCHARGE NOTE ADULT - CARE PROVIDERS DIRECT ADDRESSES
,DirectAddress_Unknown ,DirectAddress_Unknown,[email or web address]

## 2017-07-23 NOTE — CHART NOTE - NSCHARTNOTEFT_GEN_A_CORE
====================  CCU MIDNIGHT ROUNDS  ====================    PINO LEE  [id number]    ====================  SUMMARY:  ====================    51 yo M smoker, AWSTEMI s/p BRADEN x 2 mLAD 7/22, EDP 30s s/p IV lasix     ====================  NEW EVENTS:  ====================    No CP/SOB/palpitations. OOB and ambulating down hallway today    ====================  VITALS (Last 12 hrs):  ====================    T(C): 36.7 (07-23-17 @ 19:00), Max: 36.8 (07-23-17 @ 17:15)  HR: 72 (07-23-17 @ 22:00) (66 - 80)  BP: 103/63 (07-23-17 @ 22:00) (79/67 - 113/62)  BP(mean): 78 (07-23-17 @ 22:00) (18 - 90)  RR: 20 (07-23-17 @ 22:00) (16 - 20)  SpO2: 97% (07-23-17 @ 22:00) (92% - 98%)    TELEMETRY: CARIN     I&O's Summary    22 Jul 2017 07:01  -  23 Jul 2017 07:00  --------------------------------------------------------  IN: 460 mL / OUT: 1900 mL / NET: -1440 mL    23 Jul 2017 07:01 - 23 Jul 2017 22:25  --------------------------------------------------------  IN: 720 mL / OUT: 150 mL / NET: 570 mL    ====================  PLAN:  ====================  AWSTEMI - DAPT, lipitor, BB, ACE, TTE @ 72 hours to eval for LV thrombus, message left for life robert Day [facility] NP   #[id number]

## 2017-07-23 NOTE — DISCHARGE NOTE ADULT - PATIENT PORTAL LINK FT
“You can access the Follow[employer] Patient Portal, offered by [facility], by registering with the following website: [email or web address]”

## 2017-07-23 NOTE — PROGRESS NOTE ADULT - SUBJECTIVE AND OBJECTIVE BOX
Patient is a 52y old  Male who presents with a chief complaint of Chest Pain (22 Jul 2017 04:03)      Overnight Event:    REVIEW OF SYSTEMS:  	    MEDICATIONS  (STANDING):  ticagrelor 90 milliGRAM(s) Oral two times a day  atorvastatin 80 milliGRAM(s) Oral at bedtime  aspirin enteric coated 81 milliGRAM(s) Oral daily  metoprolol 25 milliGRAM(s) Oral two times a day  lisinopril 5 milliGRAM(s) Oral daily  heparin  Injectable 5000 Unit(s) SubCutaneous every 8 hours    MEDICATIONS  (PRN):        PHYSICAL EXAM:  Vital Signs Last 24 Hrs  T(C): 36.9 (23 Jul 2017 05:05), Max: 36.9 (23 Jul 2017 05:05)  T(F): 98.4 (23 Jul 2017 05:05), Max: 98.4 (23 Jul 2017 05:05)  HR: 68 (23 Jul 2017 07:00) (68 - 92)  BP: 92/65 (23 Jul 2017 07:00) (92/65 - 152/84)  BP(mean): 73 (23 Jul 2017 07:00) (67 - 120)  RR: 20 (23 Jul 2017 07:00) (14 - 30)  SpO2: 95% (23 Jul 2017 07:00) (93% - 100%)  I&O's Summary    22 Jul 2017 07:01  -  23 Jul 2017 07:00  --------------------------------------------------------  IN: 460 mL / OUT: 1900 mL / NET: -1440 mL        Appearance: Normal	  HEENT:   Normal oral mucosa, PERRL, EOMI	  Lymphatic: No lymphadenopathy  Cardiovascular: Normal S1 S2, No JVD, No murmurs, No edema  Respiratory: Lungs clear to auscultation	  Psychiatry: A & O x 3, Mood & affect appropriate  Gastrointestinal:  Soft, Non-tender, + BS	  Skin: No rashes, No ecchymoses, No cyanosis	  Neurologic: Non-focal  Extremities: Normal range of motion, No clubbing, cyanosis or edema  Vascular: Peripheral pulses palpable 2+ bilaterally    LABS:	 	                        16.7   11.5  )-----------( 241      ( 23 Jul 2017 04:21 )             50.1     Auto Eosinophil # x     / Auto Eosinophil % x     / Auto Neutrophil # x     / Auto Neutrophil % x     / BANDS % x                            15.9   12.4  )-----------( 267      ( 22 Jul 2017 05:14 )             48.3     Auto Eosinophil # x     / Auto Eosinophil % x     / Auto Neutrophil # x     / Auto Neutrophil % x     / BANDS % x                            15.1   15.4  )-----------( 260      ( 22 Jul 2017 01:01 )             46.2     Auto Eosinophil # 0.2   / Auto Eosinophil % 1.0   / Auto Neutrophil # 11.2  / Auto Neutrophil % 72.9  / BANDS % x        INR: 1.17 ratio (07-22 @ 05:15)  INR: 1.07 ratio (07-22 @ 01:02)    07-23    139  |  101  |  13  ----------------------------<  111<H>  4.4   |  23  |  0.82  07-22    139  |  98  |  12  ----------------------------<  135<H>  4.2   |  22  |  0.86  07-22    140  |  100  |  12  ----------------------------<  109<H>  3.8   |  24  |  0.72    Ca    9.7      23 Jul 2017 04:21  Mg     2.0     07-23  Phos  4.1     07-23  TPro  7.9  /  Alb  4.1  /  TBili  0.9  /  DBili  x   /  AST  102<H>  /  ALT  41  /  AlkPhos  72  07-23  TPro  8.1  /  Alb  4.4  /  TBili  0.7  /  DBili  x   /  AST  146<H>  /  ALT  45  /  AlkPhos  73  07-22  TPro  8.5<H>  /  Alb  4.7  /  TBili  0.5  /  DBili  x   /  AST  133<H>  /  ALT  40  /  AlkPhos  75  07-22    Lactate, Blood: 2.0 mmol/L (07-22 @ 05:14)      proBNP: Serum Pro-Brain Natriuretic Peptide: 10 pg/mL (07-22 @ 01:01)    Lipid Profile:   HgA1c:   TSH:     CARDIAC MARKERS:   22 Jul 2017 18:47 Troponin 1.92 ng/mL / Creatine Kinase 1545 U/L /  CKMB 105.9 ng/mL / CPK Mass Assay % 6.9 %   22 Jul 2017 10:29 Troponin 2.50 ng/mL / Creatine Kinase 1663 U/L /  CKMB 139.9 ng/mL / CPK Mass Assay % 8.4 %   22 Jul 2017 05:15 Troponin 0.72 ng/mL / Creatine Kinase 932 U/L /  CKMB 74.8 ng/mL / CPK Mass Assay % 8.0 %        TELEMETRY: 	    ECG:  	  RADIOLOGY:  OTHER: 	    PREVIOUS DIAGNOSTIC TESTING:    [ ] Echocardiogram:  [ ]  Catheterization:  [ ] Stress Test:  	  	  JOSE Alcantara  Contact #

## 2017-07-23 NOTE — DISCHARGE NOTE ADULT - HOSPITAL COURSE
53yo M with no PMH, active smoker p/w crushing chest pain, anterior lead JIMMY, s/p BRADEN x 2 to mid-% lesion.     7/22 LHC: mid-% BRADEN x 2, distal Cx 90% no intervention, prox % chronic  7/22 TTE: 30-35% EF Moderate-severe segmental LVSD The mid to apical inferoseptum, anteroseptum and the remaining apical segments are akinetic. No LV thrombus 53yo M with no PMH, active smoker p/w crushing chest pain, anterior lead JIMMY, s/p BRADEN x 2 to mid-% lesion.     7/22 LHC: mid-% BRADEN x 2, distal Cx 90% no intervention, prox % chronic  7/22 TTE: 30-35% EF Moderate-severe segmental LVSD The mid to apical inferoseptum, anteroseptum and the remaining apical segments are akinetic. No LV thrombus  7/24: Given 30-35% EF patient needs life vest, EP consulted. The patient is a 53 yo M with no PMH, active smoker, p/w 9/10 crushing substernal chest pain, non-radiating. Was brought to ED by EMS. In ED EKG showed JIMMY in V1, V2, tall peaked T waves in lateral leads. Patient got ASA, Brillanta, started on heparin and sent to the cath lab. Cath showed pLAD 100% occluded, pRCA 100% occluded, mCx 70% occluded. pRCA and mCX showed good collaterals, BRADEN x2 given to pLAD. TTE 7/22 showed 30-35% EF moderate-severe segmental LVSD. The mid to apical inferoseptum, anteroseptum, and remaining apical segments were akinetic. No LV thrombus found. In CCU patient treated with ASA 81 mg, ticagrelor 90 mg po bid, metoprolol 50 mg, lisinopril 5 mg, and atorvastatin 80 mg. Pt. Hemodynamically stable throughout admission, BP systolic 104-134, diastolic 68-82, HR 74-82, sat 98 on RA. Given initial TTE showed EF 30-35%, patient given lifevest approved by EP. Patient had repeat TTE on 7/25 showing.. Cleared for discharge with f/u with...

## 2017-07-24 LAB
ALBUMIN SERPL ELPH-MCNC: 4.2 G/DL — SIGNIFICANT CHANGE UP (ref 3.3–5)
ALP SERPL-CCNC: 76 U/L — SIGNIFICANT CHANGE UP (ref 40–120)
ALT FLD-CCNC: 36 U/L RC — SIGNIFICANT CHANGE UP (ref 10–45)
ANION GAP SERPL CALC-SCNC: 15 MMOL/L — SIGNIFICANT CHANGE UP (ref 5–17)
AST SERPL-CCNC: 55 U/L — HIGH (ref 10–40)
BILIRUB SERPL-MCNC: 0.6 MG/DL — SIGNIFICANT CHANGE UP (ref 0.2–1.2)
BUN SERPL-MCNC: 19 MG/DL — SIGNIFICANT CHANGE UP (ref 7–23)
CALCIUM SERPL-MCNC: 9.8 MG/DL — SIGNIFICANT CHANGE UP (ref 8.4–10.5)
CHLORIDE SERPL-SCNC: 102 MMOL/L — SIGNIFICANT CHANGE UP (ref 96–108)
CO2 SERPL-SCNC: 22 MMOL/L — SIGNIFICANT CHANGE UP (ref 22–31)
CREAT SERPL-MCNC: 0.95 MG/DL — SIGNIFICANT CHANGE UP (ref 0.5–1.3)
GLUCOSE SERPL-MCNC: 107 MG/DL — HIGH (ref 70–99)
HCT VFR BLD CALC: 50.2 % — HIGH (ref 39–50)
HGB BLD-MCNC: 16.9 G/DL — SIGNIFICANT CHANGE UP (ref 13–17)
MAGNESIUM SERPL-MCNC: 2 MG/DL — SIGNIFICANT CHANGE UP (ref 1.6–2.6)
MCHC RBC-ENTMCNC: 32.2 PG — SIGNIFICANT CHANGE UP (ref 27–34)
MCHC RBC-ENTMCNC: 33.7 GM/DL — SIGNIFICANT CHANGE UP (ref 32–36)
MCV RBC AUTO: 95.6 FL — SIGNIFICANT CHANGE UP (ref 80–100)
PHOSPHATE SERPL-MCNC: 4.4 MG/DL — SIGNIFICANT CHANGE UP (ref 2.5–4.5)
PLATELET # BLD AUTO: 248 K/UL — SIGNIFICANT CHANGE UP (ref 150–400)
POTASSIUM SERPL-MCNC: 4.3 MMOL/L — SIGNIFICANT CHANGE UP (ref 3.5–5.3)
POTASSIUM SERPL-SCNC: 4.3 MMOL/L — SIGNIFICANT CHANGE UP (ref 3.5–5.3)
PROT SERPL-MCNC: 7.8 G/DL — SIGNIFICANT CHANGE UP (ref 6–8.3)
RBC # BLD: 5.26 M/UL — SIGNIFICANT CHANGE UP (ref 4.2–5.8)
RBC # FLD: 13.1 % — SIGNIFICANT CHANGE UP (ref 10.3–14.5)
SODIUM SERPL-SCNC: 139 MMOL/L — SIGNIFICANT CHANGE UP (ref 135–145)
T3 SERPL-MCNC: 161 NG/DL — SIGNIFICANT CHANGE UP (ref 80–200)
T4 AB SER-ACNC: 8.3 UG/DL — SIGNIFICANT CHANGE UP (ref 4.6–12)
TSH SERPL-MCNC: 3.22 UIU/ML — SIGNIFICANT CHANGE UP (ref 0.27–4.2)
WBC # BLD: 10 K/UL — SIGNIFICANT CHANGE UP (ref 3.8–10.5)
WBC # FLD AUTO: 10 K/UL — SIGNIFICANT CHANGE UP (ref 3.8–10.5)

## 2017-07-24 PROCEDURE — 99233 SBSQ HOSP IP/OBS HIGH 50: CPT

## 2017-07-24 PROCEDURE — 99223 1ST HOSP IP/OBS HIGH 75: CPT

## 2017-07-24 PROCEDURE — 93010 ELECTROCARDIOGRAM REPORT: CPT

## 2017-07-24 RX ADMIN — HEPARIN SODIUM 5000 UNIT(S): 5000 INJECTION INTRAVENOUS; SUBCUTANEOUS at 05:25

## 2017-07-24 RX ADMIN — HEPARIN SODIUM 5000 UNIT(S): 5000 INJECTION INTRAVENOUS; SUBCUTANEOUS at 21:10

## 2017-07-24 RX ADMIN — TICAGRELOR 90 MILLIGRAM(S): 90 TABLET ORAL at 17:16

## 2017-07-24 RX ADMIN — Medication 81 MILLIGRAM(S): at 10:59

## 2017-07-24 RX ADMIN — HEPARIN SODIUM 5000 UNIT(S): 5000 INJECTION INTRAVENOUS; SUBCUTANEOUS at 13:52

## 2017-07-24 RX ADMIN — TICAGRELOR 90 MILLIGRAM(S): 90 TABLET ORAL at 05:25

## 2017-07-24 RX ADMIN — LISINOPRIL 5 MILLIGRAM(S): 2.5 TABLET ORAL at 05:25

## 2017-07-24 RX ADMIN — ATORVASTATIN CALCIUM 80 MILLIGRAM(S): 80 TABLET, FILM COATED ORAL at 21:09

## 2017-07-24 RX ADMIN — Medication 30 MILLILITER(S): at 22:41

## 2017-07-24 RX ADMIN — Medication 50 MILLIGRAM(S): at 05:25

## 2017-07-24 NOTE — PROGRESS NOTE ADULT - ASSESSMENT
53 yo M with no PMH, active smoker, p/w Chest pain found to have V1 V2 JIMMY s/p BRADEN x2 to mid-% lesion 53 yo M with no PMH, active smoker, p/w Chest pain found to have V1 V2 JIMMY s/p BRADEN x2 to mid-% lesion    #Neuro   - A+O x 3  - no motor or sensory deficits    #Cardiac  - TTE 7/25 to r/o LV thrombosis  - c/w ASA, ticagrelor, metoprolol, lisinopril, lipitor  - access site R femoral, clean      #Pulmonary  - lungs CTABL

## 2017-07-24 NOTE — CONSULT NOTE ADULT - SUBJECTIVE AND OBJECTIVE BOX
Date of Admission: 7/22/2017    CHIEF COMPLAINT: Chest Pain     HISTORY OF PRESENT ILLNESS: Pt is a 53 y/o man with no known PMH who presented to the ED with c/o chest pain and was found to have an Anterior STEMI. He underwent emergent LHC and BRADEN x 2 to LAD. CAD was also noted in the RCA and LCx.       Allergies    No Known Allergies    Intolerances    	    MEDICATIONS:  ticagrelor 90 milliGRAM(s) Oral two times a day  aspirin enteric coated 81 milliGRAM(s) Oral daily  lisinopril 5 milliGRAM(s) Oral daily  heparin  Injectable 5000 Unit(s) SubCutaneous every 8 hours  metoprolol succinate ER 50 milliGRAM(s) Oral daily            atorvastatin 80 milliGRAM(s) Oral at bedtime        PAST MEDICAL & SURGICAL HISTORY:  No pertinent past medical history  No significant past surgical history      FAMILY HISTORY:  Family history of acute myocardial infarction (Grandparent)      SOCIAL HISTORY: smoker        REVIEW OF SYSTEMS:  General: no fatigue/malaise, weight loss/gain.  Skin: no rashes.  Ophthalmologic: no blurred vision, no loss of vision. 	  ENT: no sore throat, rhinorrhea, sinus congestion.  Respiratory: no SOB, cough or wheeze.  Gastrointestinal:  no N/V/D, no melena/hematemesis/hematochezia.  Genitourinary: no dysuria/hesitancy or hematuria.  Musculoskeletal: no myalgias or arthralgias.  Neurological: no changes in vision or hearing, no lightheadedness/dizziness, no syncope/near syncope	  Psychiatric: no unusual stress/anxiety.   Hematology/Lymphatics: no unusual bleeding, bruising and no lymphadenopathy.  Endocrine: no unusual thirst.   All others negative except as stated above and in HPI.    PHYSICAL EXAM:  T(C): 36.9 (07-24-17 @ 08:00), Max: 37.1 (07-24-17 @ 04:00)  HR: 78 (07-24-17 @ 16:46) (64 - 90)  BP: 112/70 (07-24-17 @ 16:46) (90/56 - 119/67)  RR: 16 (07-24-17 @ 16:46) (16 - 20)  SpO2: 97% (07-24-17 @ 12:00) (95% - 98%)  Wt(kg): --  I&O's Summary    23 Jul 2017 07:01  -  24 Jul 2017 07:00  --------------------------------------------------------  IN: 960 mL / OUT: 650 mL / NET: 310 mL    24 Jul 2017 07:01  -  24 Jul 2017 17:57  --------------------------------------------------------  IN: 360 mL / OUT: 0 mL / NET: 360 mL        Appearance: Normal	  HEENT:   Normal oral mucosa, PERRL, EOMI	  Lymphatic: No lymphadenopathy  Cardiovascular: Normal S1 S2, No JVD, No murmurs, No edema  Respiratory: Lungs clear to auscultation	  Psychiatry: A & O x 3, Mood & affect appropriate  Gastrointestinal:  Soft, Non-tender, + BS	  Skin: No rashes, No ecchymoses, No cyanosis	  Neurologic: Non-focal  Extremities: Normal range of motion, No clubbing, cyanosis or edema  Vascular: Peripheral pulses palpable 2+ bilaterally        LABS:	 	    CBC Full  -  ( 24 Jul 2017 04:34 )  WBC Count : 10.0 K/uL  Hemoglobin : 16.9 g/dL  Hematocrit : 50.2 %  Platelet Count - Automated : 248 K/uL  Mean Cell Volume : 95.6 fl  Mean Cell Hemoglobin : 32.2 pg  Mean Cell Hemoglobin Concentration : 33.7 gm/dL  Auto Neutrophil # : x  Auto Lymphocyte # : x  Auto Monocyte # : x  Auto Eosinophil # : x  Auto Basophil # : x  Auto Neutrophil % : x  Auto Lymphocyte % : x  Auto Monocyte % : x  Auto Eosinophil % : x  Auto Basophil % : x    07-24    139  |  102  |  19  ----------------------------<  107<H>  4.3   |  22  |  0.95  07-23    139  |  101  |  13  ----------------------------<  111<H>  4.4   |  23  |  0.82    Ca    9.8      24 Jul 2017 04:34  Ca    9.7      23 Jul 2017 04:21  Phos  4.4     07-24  Phos  4.1     07-23  Mg     2.0     07-24  Mg     2.0     07-23    TPro  7.8  /  Alb  4.2  /  TBili  0.6  /  DBili  < from: TTE with Doppler (w/Cont) (07.22.17 @ 08:57) >    x   /  AST  55<H>  /  ALT  36  /  AlkPhos  76  07-24  TPro  7.9  /  Alb  4.1  /  TBili  0.9  /  DBili  x   /  AST  102<H>  /  ALT  41  /  AlkPhos  72  07-23    TSH: Thyroid Stimulating Hormone, Serum: 3.22 uIU/mL (07-24 @ 06:15)    ECG: NSR with PVCs, Septal Q Waves      PREVIOUS DIAGNOSTIC TESTING:    Echocardiogram:    Conclusions:  1. Moderate-severe segmental left ventricular systolic  dysfunction. The mid to apical inferoseptum, anteroseptum  and the remaining apical segments are akinetic. No left  ventricular thrombus.  2. No pericardial effusion seen.  EF (Visual Estimate): 30-35 %      Catheterization:    < from: Cardiac Cath Lab - Adult (07.22.17 @ 01:30) >  CORONARY VESSELS: The coronary circulation is right dominant.  LM:   --  LM: Angiography showed moderate atherosclerosis.  LAD:   --  Proximal LAD: There was a diffuse 100 % stenosis. The lesion was  associated with a large filling defect consistent with thrombus. There was  MEGAN grade 0 flow through the vessel (no flow) and a large vascular  territory distal to the lesion. This lesion is a likely culprit for the  patient's recent myocardial infarction. It appears amenable to  percutaneous intervention.  CX:   --  Mid circumflex: There was a diffuse 70 % stenosis.  RCA:   --  Proximal RCA: There was a 100 % stenosis. There was good  collateral blood supply to the distal myocardium.  	  ASSESSMENT/PLAN: 53 y/o man presents with Anterior STEMI    1) s/p STEMI with Reduced EF - patient is appropriate for Lifevest for primary prevention

## 2017-07-24 NOTE — PROGRESS NOTE ADULT - SUBJECTIVE AND OBJECTIVE BOX
PATIENT:  PINO LEE  [id number]    CHIEF COMPLAINT:  Patient is a 52y old  Male who presents with a chief complaint of Chest Pain (2017 14:03)      INTERVAL HISTORY:    SUBJECTIVE:    REVIEW OF SYSTEMS:         General: No fevers, No chills, No malaise        HEENT: No headaches, No changes in vision, No dysphagia        CVS: No chest pain, No palpitations        Pulm: No SOB, No wheezing        GI: No abdominal pain, No nausea, No vomiting, No changes in bowel         : No dysuria         Ext: No edema, No claudications,    MEDICATIONS  (STANDING):  ticagrelor 90 milliGRAM(s) Oral two times a day  atorvastatin 80 milliGRAM(s) Oral at bedtime  aspirin enteric coated 81 milliGRAM(s) Oral daily  lisinopril 5 milliGRAM(s) Oral daily  heparin  Injectable 5000 Unit(s) SubCutaneous every 8 hours  metoprolol succinate ER 50 milliGRAM(s) Oral daily    MEDICATIONS  (PRN):      OBJECTIVE:  ICU Vital Signs Last 24 Hrs  T(C): 36.9 (2017 08:00), Max: 37.1 (2017 04:00)  T(F): 98.5 (2017 08:00), Max: 98.8 (2017 04:00)  HR: 72 (2017 08:00) (66 - 86)  BP: 94/62 (2017 08:00) (79/67 - 116/77)  BP(mean): 71 (2017 08:00) (18 - 90)  ABP: --  ABP(mean): --  RR: 17 (2017 08:00) (16 - 20)  SpO2: 98% (2017 08:00) (92% - 98%)      Adult Advanced Hemodynamics Last 24 Hrs  CVP(mm Hg): --  CVP(cm H2O): --  CO: --  CI: --  PA: --  PA(mean): --  PCWP: --  SVR: --  SVRI: --  PVR: --  PVRI: --  CAPILLARY BLOOD GLUCOSE        CAPILLARY BLOOD GLUCOSE  99 (2017 08:30)          I&O's Summary    2017 07:01  -  2017 07:00  --------------------------------------------------------  IN: 960 mL / OUT: 650 mL / NET: 310 mL      Daily     Daily Weight in k.6 (2017 05:00)    PHYSICAL EXAM:       General: NAD, lyng in bed       HEENT: EOMI, Nl oropharynx, neck supple, no JVD        CVS: RRR, nl S1, S2, no murmurs, gallops, or regurg       Pulm: Lungs CTA b/l, no crackles, no wheezing, no rhonchi        GI: Nl BS, soft, nontender, nondistended, no masses       : No CVA tenderness       Ext: + Peripheral pulses, no edema, no cyanosis, no clubbing       Neuro: AOx3, no focal deficits       TELEMETRY:     EKG:     IMAGING:    LABS:                          16.9   10.0  )-----------( 248      ( 2017 04:34 )             50.2     07-    139  |  102  |  19  ----------------------------<  107<H>  4.3   |  22  |  0.95    Ca    9.8      2017 04:34  Phos  4.4     07-24  Mg     2.0     -24    TPro  7.8  /  Alb  4.2  /  TBili  0.6  /  DBili  x   /  AST  55<H>  /  ALT  36  /  AlkPhos  76  07-24    LIVER FUNCTIONS - ( 2017 04:34 )  Alb: 4.2 g/dL / Pro: 7.8 g/dL / ALK PHOS: 76 U/L / ALT: 36 U/L RC / AST: 55 U/L / GGT: x PATIENT:  PINO LEE  [id number]    CHIEF COMPLAINT:  Patient is a 52y old  Male who presents with a chief complaint of Chest Pain (2017 14:03)      INTERVAL HISTORY:    No complaints overnight. No new pain. Eating well. Normal BM.     SUBJECTIVE:    REVIEW OF SYSTEMS:         General: No fevers, No chills, No malaise        HEENT: No headaches, No changes in vision, No dysphagia        CVS: No chest pain, No palpitations        Pulm: No SOB, No wheezing        GI: No abdominal pain, No nausea, No vomiting, No changes in bowel         : No dysuria         Ext: No edema, No claudications,    MEDICATIONS  (STANDING):  ticagrelor 90 milliGRAM(s) Oral two times a day  atorvastatin 80 milliGRAM(s) Oral at bedtime  aspirin enteric coated 81 milliGRAM(s) Oral daily  lisinopril 5 milliGRAM(s) Oral daily  heparin  Injectable 5000 Unit(s) SubCutaneous every 8 hours  metoprolol succinate ER 50 milliGRAM(s) Oral daily    MEDICATIONS  (PRN):      OBJECTIVE:  ICU Vital Signs Last 24 Hrs  T(C): 36.9 (2017 08:00), Max: 37.1 (2017 04:00)  T(F): 98.5 (2017 08:00), Max: 98.8 (2017 04:00)  HR: 72 (2017 08:00) (66 - 86)  BP: 94/62 (2017 08:00) (79/67 - 116/77)  BP(mean): 71 (2017 08:00) (18 - 90)  ABP: --  ABP(mean): --  RR: 17 (2017 08:00) (16 - 20)  SpO2: 98% (2017 08:00) (92% - 98%)      Adult Advanced Hemodynamics Last 24 Hrs  CVP(mm Hg): --  CVP(cm H2O): --  CO: --  CI: --  PA: --  PA(mean): --  PCWP: --  SVR: --  SVRI: --  PVR: --  PVRI: --  CAPILLARY BLOOD GLUCOSE        CAPILLARY BLOOD GLUCOSE  99 (2017 08:30)          I&O's Summary    2017 07:01  -  2017 07:00  --------------------------------------------------------  IN: 960 mL / OUT: 650 mL / NET: 310 mL      Daily     Daily Weight in k.6 (2017 05:00)    PHYSICAL EXAM:       General: NAD, lyng in bed       HEENT: EOMI, Nl oropharynx, neck supple, no JVD        CVS: RRR, nl S1, S2, no murmurs, gallops, or regurg       Pulm: Lungs CTA b/l, no crackles, no wheezing, no rhonchi        GI: Nl BS, soft, nontender, nondistended, no masses       : No CVA tenderness       Ext: + Peripheral pulses, no edema, no cyanosis, no clubbing       Neuro: AOx3, no focal deficits       TELEMETRY:     EKG:     IMAGING:    LABS:                          16.9   10.0  )-----------( 248      ( 2017 04:34 )             50.2     07-    139  |  102  |  19  ----------------------------<  107<H>  4.3   |  22  |  0.95    Ca    9.8      2017 04:34  Phos  4.4     07-24  Mg     2.0     -24    TPro  7.8  /  Alb  4.2  /  TBili  0.6  /  DBili  x   /  AST  55<H>  /  ALT  36  /  AlkPhos  76  07-24    LIVER FUNCTIONS - ( 2017 04:34 )  Alb: 4.2 g/dL / Pro: 7.8 g/dL / ALK PHOS: 76 U/L / ALT: 36 U/L RC / AST: 55 U/L / GGT: x

## 2017-07-24 NOTE — CHART NOTE - NSCHARTNOTEFT_GEN_A_CORE
====================  CCU MIDNIGHT ROUNDS  ====================    PINO LEE  [id number]    ====================  SUMMARY:  ====================    53 yo M smoker, AWSTEMI s/p BRADEN x 2 mLAD 7/22, EDP 30s s/p IV lasix     ====================  NEW EVENTS:  ====================    No CP/SOB/palpitations. OOB, ambulating. Life vest fitting today, TTE in AM,    ====================  VITALS (Last 12 hrs):  ====================    T(C): 36.8 (07-24-17 @ 23:05), Max: 36.8 (07-24-17 @ 23:05)  HR: 82 (07-24-17 @ 23:05) (64 - 90)  BP: 118/67 (07-24-17 @ 23:05) (92/69 - 119/67)  BP(mean): 99 (07-24-17 @ 23:05) (64 - 99)  RR: 16 (07-24-17 @ 20:05) (16 - 17)  SpO2: 95% (07-24-17 @ 20:05) (95% - 98%)    TELEMETRY: CARIN     I&O's Summary    23 Jul 2017 07:01  -  24 Jul 2017 07:00  --------------------------------------------------------  IN: 960 mL / OUT: 650 mL / NET: 310 mL    24 Jul 2017 07:01  -  24 Jul 2017 23:20  --------------------------------------------------------  IN: 600 mL / OUT: 300 mL / NET: 300 mL    ====================  PLAN:  ====================  1. AWSTEMI - c/w DAPT, statin, BB, ACE, TTE in AM, lifevest in AM, DC in AM if stable     Genny Day U NP   #[id number]

## 2017-07-25 VITALS
DIASTOLIC BLOOD PRESSURE: 70 MMHG | RESPIRATION RATE: 17 BRPM | TEMPERATURE: 98 F | SYSTOLIC BLOOD PRESSURE: 116 MMHG | HEART RATE: 84 BPM

## 2017-07-25 PROBLEM — Z00.00 ENCOUNTER FOR PREVENTIVE HEALTH EXAMINATION: Status: ACTIVE | Noted: 2017-07-25

## 2017-07-25 LAB
ALBUMIN SERPL ELPH-MCNC: 4.3 G/DL — SIGNIFICANT CHANGE UP (ref 3.3–5)
ALP SERPL-CCNC: 81 U/L — SIGNIFICANT CHANGE UP (ref 40–120)
ALT FLD-CCNC: 37 U/L RC — SIGNIFICANT CHANGE UP (ref 10–45)
ANION GAP SERPL CALC-SCNC: 16 MMOL/L — SIGNIFICANT CHANGE UP (ref 5–17)
AST SERPL-CCNC: 40 U/L — SIGNIFICANT CHANGE UP (ref 10–40)
BILIRUB SERPL-MCNC: 0.6 MG/DL — SIGNIFICANT CHANGE UP (ref 0.2–1.2)
BUN SERPL-MCNC: 19 MG/DL — SIGNIFICANT CHANGE UP (ref 7–23)
CALCIUM SERPL-MCNC: 9.6 MG/DL — SIGNIFICANT CHANGE UP (ref 8.4–10.5)
CHLORIDE SERPL-SCNC: 100 MMOL/L — SIGNIFICANT CHANGE UP (ref 96–108)
CO2 SERPL-SCNC: 21 MMOL/L — LOW (ref 22–31)
CREAT SERPL-MCNC: 0.9 MG/DL — SIGNIFICANT CHANGE UP (ref 0.5–1.3)
GLUCOSE SERPL-MCNC: 109 MG/DL — HIGH (ref 70–99)
HCT VFR BLD CALC: 51.5 % — HIGH (ref 39–50)
HGB BLD-MCNC: 17 G/DL — SIGNIFICANT CHANGE UP (ref 13–17)
MAGNESIUM SERPL-MCNC: 2.1 MG/DL — SIGNIFICANT CHANGE UP (ref 1.6–2.6)
MCHC RBC-ENTMCNC: 31.4 PG — SIGNIFICANT CHANGE UP (ref 27–34)
MCHC RBC-ENTMCNC: 33 GM/DL — SIGNIFICANT CHANGE UP (ref 32–36)
MCV RBC AUTO: 95.1 FL — SIGNIFICANT CHANGE UP (ref 80–100)
PHOSPHATE SERPL-MCNC: 3.7 MG/DL — SIGNIFICANT CHANGE UP (ref 2.5–4.5)
PLATELET # BLD AUTO: 242 K/UL — SIGNIFICANT CHANGE UP (ref 150–400)
POTASSIUM SERPL-MCNC: 4.3 MMOL/L — SIGNIFICANT CHANGE UP (ref 3.5–5.3)
POTASSIUM SERPL-SCNC: 4.3 MMOL/L — SIGNIFICANT CHANGE UP (ref 3.5–5.3)
PROT SERPL-MCNC: 8.1 G/DL — SIGNIFICANT CHANGE UP (ref 6–8.3)
RBC # BLD: 5.41 M/UL — SIGNIFICANT CHANGE UP (ref 4.2–5.8)
RBC # FLD: 13 % — SIGNIFICANT CHANGE UP (ref 10.3–14.5)
SODIUM SERPL-SCNC: 137 MMOL/L — SIGNIFICANT CHANGE UP (ref 135–145)
WBC # BLD: 9.4 K/UL — SIGNIFICANT CHANGE UP (ref 3.8–10.5)
WBC # FLD AUTO: 9.4 K/UL — SIGNIFICANT CHANGE UP (ref 3.8–10.5)

## 2017-07-25 PROCEDURE — C9606: CPT | Mod: LD

## 2017-07-25 PROCEDURE — C1887: CPT

## 2017-07-25 PROCEDURE — 86901 BLOOD TYPING SEROLOGIC RH(D): CPT

## 2017-07-25 PROCEDURE — 85730 THROMBOPLASTIN TIME PARTIAL: CPT

## 2017-07-25 PROCEDURE — C1874: CPT

## 2017-07-25 PROCEDURE — 85610 PROTHROMBIN TIME: CPT

## 2017-07-25 PROCEDURE — 93308 TTE F-UP OR LMTD: CPT | Mod: 26

## 2017-07-25 PROCEDURE — 84484 ASSAY OF TROPONIN QUANT: CPT

## 2017-07-25 PROCEDURE — 86900 BLOOD TYPING SEROLOGIC ABO: CPT

## 2017-07-25 PROCEDURE — 80061 LIPID PANEL: CPT

## 2017-07-25 PROCEDURE — 93321 DOPPLER ECHO F-UP/LMTD STD: CPT | Mod: 26

## 2017-07-25 PROCEDURE — 86850 RBC ANTIBODY SCREEN: CPT

## 2017-07-25 PROCEDURE — 82435 ASSAY OF BLOOD CHLORIDE: CPT

## 2017-07-25 PROCEDURE — 84100 ASSAY OF PHOSPHORUS: CPT

## 2017-07-25 PROCEDURE — 93010 ELECTROCARDIOGRAM REPORT: CPT

## 2017-07-25 PROCEDURE — 99233 SBSQ HOSP IP/OBS HIGH 50: CPT | Mod: GC

## 2017-07-25 PROCEDURE — 82803 BLOOD GASES ANY COMBINATION: CPT

## 2017-07-25 PROCEDURE — 84480 ASSAY TRIIODOTHYRONINE (T3): CPT

## 2017-07-25 PROCEDURE — 84295 ASSAY OF SERUM SODIUM: CPT

## 2017-07-25 PROCEDURE — 99285 EMERGENCY DEPT VISIT HI MDM: CPT | Mod: 25

## 2017-07-25 PROCEDURE — 84436 ASSAY OF TOTAL THYROXINE: CPT

## 2017-07-25 PROCEDURE — 82330 ASSAY OF CALCIUM: CPT

## 2017-07-25 PROCEDURE — 85379 FIBRIN DEGRADATION QUANT: CPT

## 2017-07-25 PROCEDURE — 93005 ELECTROCARDIOGRAM TRACING: CPT

## 2017-07-25 PROCEDURE — 83735 ASSAY OF MAGNESIUM: CPT

## 2017-07-25 PROCEDURE — 82553 CREATINE MB FRACTION: CPT

## 2017-07-25 PROCEDURE — 84443 ASSAY THYROID STIM HORMONE: CPT

## 2017-07-25 PROCEDURE — 82947 ASSAY GLUCOSE BLOOD QUANT: CPT

## 2017-07-25 PROCEDURE — C1894: CPT

## 2017-07-25 PROCEDURE — 83605 ASSAY OF LACTIC ACID: CPT

## 2017-07-25 PROCEDURE — 84132 ASSAY OF SERUM POTASSIUM: CPT

## 2017-07-25 PROCEDURE — 93321 DOPPLER ECHO F-UP/LMTD STD: CPT

## 2017-07-25 PROCEDURE — C1725: CPT

## 2017-07-25 PROCEDURE — 83036 HEMOGLOBIN GLYCOSYLATED A1C: CPT

## 2017-07-25 PROCEDURE — 96375 TX/PRO/DX INJ NEW DRUG ADDON: CPT

## 2017-07-25 PROCEDURE — C8924: CPT

## 2017-07-25 PROCEDURE — 85027 COMPLETE CBC AUTOMATED: CPT

## 2017-07-25 PROCEDURE — 83880 ASSAY OF NATRIURETIC PEPTIDE: CPT

## 2017-07-25 PROCEDURE — 80053 COMPREHEN METABOLIC PANEL: CPT

## 2017-07-25 PROCEDURE — 71045 X-RAY EXAM CHEST 1 VIEW: CPT

## 2017-07-25 PROCEDURE — 85014 HEMATOCRIT: CPT

## 2017-07-25 PROCEDURE — 82550 ASSAY OF CK (CPK): CPT

## 2017-07-25 PROCEDURE — C1769: CPT

## 2017-07-25 PROCEDURE — 96374 THER/PROPH/DIAG INJ IV PUSH: CPT

## 2017-07-25 PROCEDURE — 93458 L HRT ARTERY/VENTRICLE ANGIO: CPT | Mod: 59

## 2017-07-25 PROCEDURE — C8929: CPT

## 2017-07-25 RX ORDER — ATORVASTATIN CALCIUM 80 MG/1
1 TABLET, FILM COATED ORAL
Qty: 0 | Refills: 0 | COMMUNITY
Start: 2017-07-25

## 2017-07-25 RX ORDER — ATORVASTATIN CALCIUM 80 MG/1
1 TABLET, FILM COATED ORAL
Qty: 30 | Refills: 0 | OUTPATIENT
Start: 2017-07-25 | End: 2017-08-24

## 2017-07-25 RX ORDER — LISINOPRIL 2.5 MG/1
1 TABLET ORAL
Qty: 0 | Refills: 0 | COMMUNITY
Start: 2017-07-25

## 2017-07-25 RX ORDER — TICAGRELOR 90 MG/1
1 TABLET ORAL
Qty: 0 | Refills: 0 | COMMUNITY
Start: 2017-07-25

## 2017-07-25 RX ORDER — METOPROLOL TARTRATE 50 MG
1 TABLET ORAL
Qty: 30 | Refills: 0 | OUTPATIENT
Start: 2017-07-25 | End: 2017-08-24

## 2017-07-25 RX ORDER — METOPROLOL TARTRATE 50 MG
1 TABLET ORAL
Qty: 0 | Refills: 0 | COMMUNITY
Start: 2017-07-25

## 2017-07-25 RX ORDER — LISINOPRIL 2.5 MG/1
1 TABLET ORAL
Qty: 30 | Refills: 0 | OUTPATIENT
Start: 2017-07-25 | End: 2017-08-24

## 2017-07-25 RX ORDER — TICAGRELOR 90 MG/1
1 TABLET ORAL
Qty: 60 | Refills: 0 | OUTPATIENT
Start: 2017-07-25 | End: 2017-08-24

## 2017-07-25 RX ADMIN — TICAGRELOR 90 MILLIGRAM(S): 90 TABLET ORAL at 05:30

## 2017-07-25 RX ADMIN — Medication 50 MILLIGRAM(S): at 05:31

## 2017-07-25 RX ADMIN — Medication 81 MILLIGRAM(S): at 12:09

## 2017-07-25 RX ADMIN — LISINOPRIL 5 MILLIGRAM(S): 2.5 TABLET ORAL at 05:30

## 2017-07-25 RX ADMIN — HEPARIN SODIUM 5000 UNIT(S): 5000 INJECTION INTRAVENOUS; SUBCUTANEOUS at 05:31

## 2017-07-25 NOTE — PROGRESS NOTE ADULT - ASSESSMENT
53 yo M with no PMH, active smoker, p/w Chest pain found to have V1 V2 JIMMY s/p BRADEN x2 to mid-% lesion    #Neuro   - A+O x 3  - no motor or sensory deficits    #Cardiac  - TTE 7/25 to r/o LV thrombosis  - c/w ASA, ticagrelor, metoprolol, lisinopril, lipitor  - access site R femoral, clean      #Pulmonary  - lungs CTABL

## 2017-07-25 NOTE — PROGRESS NOTE ADULT - ATTENDING COMMENTS
Patient seen/examined at bedside. Agree with above with the following changes.    52 year old male, smoker, with AWSTEMI, s/p BRADEN x 2 to LAD, now in CCU.  -DAPT, statin; BP better controlled; Ectopy resolved on lopressor. AIVR resolved.  -Lisinopril started, watching creatinine post cath  -TTE with moderate to severe LV dysfunction, repeat TTE prior to d/c to r/o LV thrombus  -TSH, Hba1c, lipid panel  -CE trending down    Patient at high risk for decompensation. >35 minutes of critical care time was spent with this patient..
Patient is seen and examined with fellow, NP and the CCU house-staff. I agree with the history, physical and the assessment and plan.  STEMI s/p PCI with BRADEN to pLAD  - educated on the importance of DAPT   - Ace-inhibitor initiated  - Beta-blocker initiated  - patient is on Lipitor 80 mg daily  - TTE limited today to r/o thrombus  - life vest eval pending
Patient was seen and examined with CCU team. I agree with findings and plan. EP evaluate for life vest. Increase ambulation. d/c 7/25/17

## 2017-07-25 NOTE — PROGRESS NOTE ADULT - SUBJECTIVE AND OBJECTIVE BOX
Overnight Events:    VITAL SIGNS:    Vital Signs Last 24 Hrs  T(C): 36.7 (25 Jul 2017 05:05), Max: 36.9 (24 Jul 2017 08:00)  T(F): 98.1 (25 Jul 2017 05:05), Max: 98.5 (24 Jul 2017 08:00)  HR: 78 (25 Jul 2017 07:00) (64 - 90)  BP: 127/65 (25 Jul 2017 07:00) (92/69 - 134/82)  BP(mean): 81 (25 Jul 2017 05:05) (64 - 100)  RR: 16 (25 Jul 2017 07:00) (14 - 18)  SpO2: 95% (24 Jul 2017 20:05) (95% - 98%)    I&O's Summary    24 Jul 2017 07:01  -  25 Jul 2017 07:00  --------------------------------------------------------  IN: 600 mL / OUT: 300 mL / NET: 300 mL          PHYSICAL EXAM:     GENERAL: no acute distress  HEENT: NC/AT, EOMI, neck supple, MMM  RESPIRATORY: LCTAB/L, no rhonchi, rales, or wheezing  CARDIOVASCULAR: RRR, no murmurs, gallops, rubs  ABDOMINAL: soft, non-tender, non-distended, positive bowel sounds   EXTREMITIES: no clubbing, cyanosis, or edema  NEUROLOGICAL: alert and oriented x 3, non-focal  SKIN: no rashes or lesions   MUSCULOSKELETAL: no gross joint deformity                          17.0   9.4   )-----------( 242      ( 25 Jul 2017 04:39 )             51.5     07-25    137  |  100  |  19  ----------------------------<  109<H>  4.3   |  21<L>  |  0.90    Ca    9.6      25 Jul 2017 04:39  Phos  3.7     07-25  Mg     2.1     07-25    TPro  8.1  /  Alb  4.3  /  TBili  0.6  /  DBili  x   /  AST  40  /  ALT  37  /  AlkPhos  81  07-25      CAPILLARY BLOOD GLUCOSE          MEDICATIONS  (STANDING):  ticagrelor 90 milliGRAM(s) Oral two times a day  atorvastatin 80 milliGRAM(s) Oral at bedtime  aspirin enteric coated 81 milliGRAM(s) Oral daily  lisinopril 5 milliGRAM(s) Oral daily  heparin  Injectable 5000 Unit(s) SubCutaneous every 8 hours  metoprolol succinate ER 50 milliGRAM(s) Oral daily      Allergies    No Known Allergies    Intolerances        Radiology

## 2017-08-29 DIAGNOSIS — R07.9 CHEST PAIN, UNSPECIFIED: ICD-10-CM

## 2017-08-29 DIAGNOSIS — I10 ESSENTIAL (PRIMARY) HYPERTENSION: ICD-10-CM

## 2017-08-29 DIAGNOSIS — F17.200 NICOTINE DEPENDENCE, UNSPECIFIED, UNCOMPLICATED: ICD-10-CM

## 2017-08-29 RX ORDER — ATORVASTATIN CALCIUM 80 MG/1
80 TABLET, FILM COATED ORAL
Qty: 10 | Refills: 3 | Status: ACTIVE | COMMUNITY

## 2017-08-29 RX ORDER — LISINOPRIL 5 MG/1
5 TABLET ORAL
Qty: 30 | Refills: 3 | Status: ACTIVE | COMMUNITY

## 2017-08-29 RX ORDER — ASPIRIN 81 MG/1
81 TABLET, COATED ORAL
Refills: 0 | Status: ACTIVE | COMMUNITY

## 2017-08-29 RX ORDER — TICAGRELOR 90 MG/1
90 TABLET ORAL
Qty: 14 | Refills: 1 | Status: ACTIVE | COMMUNITY

## 2017-08-30 ENCOUNTER — NON-APPOINTMENT (OUTPATIENT)
Age: 52
End: 2017-08-30

## 2017-08-30 ENCOUNTER — APPOINTMENT (OUTPATIENT)
Dept: ELECTROPHYSIOLOGY | Facility: CLINIC | Age: 52
End: 2017-08-30
Payer: COMMERCIAL

## 2017-08-30 VITALS
SYSTOLIC BLOOD PRESSURE: 125 MMHG | HEART RATE: 63 BPM | DIASTOLIC BLOOD PRESSURE: 80 MMHG | BODY MASS INDEX: 31.14 KG/M2 | HEIGHT: 70.5 IN | OXYGEN SATURATION: 97 % | WEIGHT: 220 LBS

## 2017-08-30 DIAGNOSIS — I21.3 ST ELEVATION (STEMI) MYOCARDIAL INFARCTION OF UNSPECIFIED SITE: ICD-10-CM

## 2017-08-30 DIAGNOSIS — I51.9 HEART DISEASE, UNSPECIFIED: ICD-10-CM

## 2017-08-30 DIAGNOSIS — I25.10 ATHEROSCLEROTIC HEART DISEASE OF NATIVE CORONARY ARTERY W/OUT ANGINA PECTORIS: ICD-10-CM

## 2017-08-30 DIAGNOSIS — Z82.49 FAMILY HISTORY OF ISCHEMIC HEART DISEASE AND OTHER DISEASES OF THE CIRCULATORY SYSTEM: ICD-10-CM

## 2017-08-30 PROCEDURE — 93000 ELECTROCARDIOGRAM COMPLETE: CPT

## 2017-08-30 PROCEDURE — 99215 OFFICE O/P EST HI 40 MIN: CPT

## 2017-08-30 RX ORDER — MULTIVITAMIN
TABLET ORAL
Refills: 0 | Status: ACTIVE | COMMUNITY

## 2017-08-30 RX ORDER — METOPROLOL SUCCINATE 50 MG/1
50 TABLET, EXTENDED RELEASE ORAL
Qty: 60 | Refills: 2 | Status: ACTIVE | COMMUNITY

## 2017-09-05 PROBLEM — I21.3 STEMI (ST ELEVATION MYOCARDIAL INFARCTION): Status: ACTIVE | Noted: 2017-08-29

## 2017-09-05 PROBLEM — I25.10 CAD (CORONARY ARTERY DISEASE): Status: ACTIVE | Noted: 2017-08-29

## 2017-09-21 ENCOUNTER — RESULT REVIEW (OUTPATIENT)
Age: 52
End: 2017-09-21

## 2018-02-21 ENCOUNTER — INPATIENT (INPATIENT)
Facility: HOSPITAL | Age: 53
LOS: 0 days | Discharge: HOME | End: 2018-02-22
Attending: INTERNAL MEDICINE | Admitting: INTERNAL MEDICINE
Payer: COMMERCIAL

## 2018-02-21 ENCOUNTER — TRANSCRIPTION ENCOUNTER (OUTPATIENT)
Age: 53
End: 2018-02-21

## 2018-02-21 VITALS
SYSTOLIC BLOOD PRESSURE: 110 MMHG | WEIGHT: 238.76 LBS | HEIGHT: 71 IN | DIASTOLIC BLOOD PRESSURE: 69 MMHG | RESPIRATION RATE: 16 BRPM | HEART RATE: 61 BPM | TEMPERATURE: 98 F | OXYGEN SATURATION: 100 %

## 2018-02-21 DIAGNOSIS — R06.02 SHORTNESS OF BREATH: ICD-10-CM

## 2018-02-21 DIAGNOSIS — Z95.5 PRESENCE OF CORONARY ANGIOPLASTY IMPLANT AND GRAFT: Chronic | ICD-10-CM

## 2018-02-21 LAB
BUN SERPL-MCNC: 11 MG/DL — SIGNIFICANT CHANGE UP (ref 7–23)
CALCIUM SERPL-MCNC: 9.3 MG/DL — SIGNIFICANT CHANGE UP (ref 8.4–10.5)
CHLORIDE SERPL-SCNC: 103 MMOL/L — SIGNIFICANT CHANGE UP (ref 98–107)
CO2 SERPL-SCNC: 26 MMOL/L — SIGNIFICANT CHANGE UP (ref 22–31)
CREAT SERPL-MCNC: 0.85 MG/DL — SIGNIFICANT CHANGE UP (ref 0.5–1.3)
GLUCOSE SERPL-MCNC: 95 MG/DL — SIGNIFICANT CHANGE UP (ref 70–99)
HBA1C BLD-MCNC: 6.6 % — HIGH (ref 4–5.6)
HCT VFR BLD CALC: 46.5 % — SIGNIFICANT CHANGE UP (ref 39–50)
HGB BLD-MCNC: 14.9 G/DL — SIGNIFICANT CHANGE UP (ref 13–17)
MCHC RBC-ENTMCNC: 29.4 PG — SIGNIFICANT CHANGE UP (ref 27–34)
MCHC RBC-ENTMCNC: 32 % — SIGNIFICANT CHANGE UP (ref 32–36)
MCV RBC AUTO: 91.9 FL — SIGNIFICANT CHANGE UP (ref 80–100)
NRBC # FLD: 0 — SIGNIFICANT CHANGE UP
PLATELET # BLD AUTO: 276 K/UL — SIGNIFICANT CHANGE UP (ref 150–400)
PMV BLD: 10.8 FL — SIGNIFICANT CHANGE UP (ref 7–13)
POTASSIUM SERPL-MCNC: 4.8 MMOL/L — SIGNIFICANT CHANGE UP (ref 3.5–5.3)
POTASSIUM SERPL-SCNC: 4.8 MMOL/L — SIGNIFICANT CHANGE UP (ref 3.5–5.3)
RBC # BLD: 5.06 M/UL — SIGNIFICANT CHANGE UP (ref 4.2–5.8)
RBC # FLD: 14.6 % — HIGH (ref 10.3–14.5)
SODIUM SERPL-SCNC: 140 MMOL/L — SIGNIFICANT CHANGE UP (ref 135–145)
WBC # BLD: 8.08 K/UL — SIGNIFICANT CHANGE UP (ref 3.8–10.5)
WBC # FLD AUTO: 8.08 K/UL — SIGNIFICANT CHANGE UP (ref 3.8–10.5)

## 2018-02-21 PROCEDURE — 92928 PRQ TCAT PLMT NTRAC ST 1 LES: CPT | Mod: LC,GC

## 2018-02-21 PROCEDURE — 93010 ELECTROCARDIOGRAM REPORT: CPT | Mod: 76

## 2018-02-21 PROCEDURE — 99152 MOD SED SAME PHYS/QHP 5/>YRS: CPT | Mod: 59

## 2018-02-21 PROCEDURE — 93458 L HRT ARTERY/VENTRICLE ANGIO: CPT | Mod: 26,59,GC

## 2018-02-21 RX ORDER — HEPARIN SODIUM 5000 [USP'U]/ML
5000 INJECTION INTRAVENOUS; SUBCUTANEOUS EVERY 12 HOURS
Qty: 0 | Refills: 0 | Status: DISCONTINUED | OUTPATIENT
Start: 2018-02-22 | End: 2018-02-22

## 2018-02-21 RX ORDER — LISINOPRIL 2.5 MG/1
5 TABLET ORAL DAILY
Qty: 0 | Refills: 0 | Status: DISCONTINUED | OUTPATIENT
Start: 2018-02-21 | End: 2018-02-22

## 2018-02-21 RX ORDER — ISOSORBIDE MONONITRATE 60 MG/1
1 TABLET, EXTENDED RELEASE ORAL
Qty: 0 | Refills: 0 | COMMUNITY

## 2018-02-21 RX ORDER — ASPIRIN/CALCIUM CARB/MAGNESIUM 324 MG
81 TABLET ORAL DAILY
Qty: 0 | Refills: 0 | Status: DISCONTINUED | OUTPATIENT
Start: 2018-02-22 | End: 2018-02-22

## 2018-02-21 RX ORDER — INFLUENZA VIRUS VACCINE 15; 15; 15; 15 UG/.5ML; UG/.5ML; UG/.5ML; UG/.5ML
0.5 SUSPENSION INTRAMUSCULAR ONCE
Qty: 0 | Refills: 0 | Status: COMPLETED | OUTPATIENT
Start: 2018-02-21 | End: 2018-02-21

## 2018-02-21 RX ORDER — METOPROLOL TARTRATE 50 MG
50 TABLET ORAL DAILY
Qty: 0 | Refills: 0 | Status: DISCONTINUED | OUTPATIENT
Start: 2018-02-21 | End: 2018-02-22

## 2018-02-21 RX ORDER — TICAGRELOR 90 MG/1
90 TABLET ORAL
Qty: 0 | Refills: 0 | Status: DISCONTINUED | OUTPATIENT
Start: 2018-02-21 | End: 2018-02-22

## 2018-02-21 RX ORDER — EZETIMIBE 10 MG/1
1 TABLET ORAL
Qty: 0 | Refills: 0 | COMMUNITY

## 2018-02-21 RX ORDER — SODIUM CHLORIDE 9 MG/ML
500 INJECTION INTRAMUSCULAR; INTRAVENOUS; SUBCUTANEOUS
Qty: 0 | Refills: 0 | Status: DISCONTINUED | OUTPATIENT
Start: 2018-02-21 | End: 2018-02-22

## 2018-02-21 RX ORDER — SODIUM CHLORIDE 9 MG/ML
3 INJECTION INTRAMUSCULAR; INTRAVENOUS; SUBCUTANEOUS EVERY 8 HOURS
Qty: 0 | Refills: 0 | Status: DISCONTINUED | OUTPATIENT
Start: 2018-02-21 | End: 2018-02-22

## 2018-02-21 RX ORDER — ISOSORBIDE MONONITRATE 60 MG/1
30 TABLET, EXTENDED RELEASE ORAL DAILY
Qty: 0 | Refills: 0 | Status: DISCONTINUED | OUTPATIENT
Start: 2018-02-21 | End: 2018-02-22

## 2018-02-21 RX ORDER — ATORVASTATIN CALCIUM 80 MG/1
80 TABLET, FILM COATED ORAL AT BEDTIME
Qty: 0 | Refills: 0 | Status: DISCONTINUED | OUTPATIENT
Start: 2018-02-21 | End: 2018-02-22

## 2018-02-21 RX ORDER — ASPIRIN/CALCIUM CARB/MAGNESIUM 324 MG
1 TABLET ORAL
Qty: 0 | Refills: 0 | COMMUNITY

## 2018-02-21 RX ADMIN — SODIUM CHLORIDE 3 MILLILITER(S): 9 INJECTION INTRAMUSCULAR; INTRAVENOUS; SUBCUTANEOUS at 16:46

## 2018-02-21 RX ADMIN — SODIUM CHLORIDE 75 MILLILITER(S): 9 INJECTION INTRAMUSCULAR; INTRAVENOUS; SUBCUTANEOUS at 14:55

## 2018-02-21 RX ADMIN — ATORVASTATIN CALCIUM 80 MILLIGRAM(S): 80 TABLET, FILM COATED ORAL at 21:37

## 2018-02-21 RX ADMIN — TICAGRELOR 90 MILLIGRAM(S): 90 TABLET ORAL at 21:37

## 2018-02-21 RX ADMIN — SODIUM CHLORIDE 3 MILLILITER(S): 9 INJECTION INTRAMUSCULAR; INTRAVENOUS; SUBCUTANEOUS at 21:37

## 2018-02-21 NOTE — H&P CARDIOLOGY - NEGATIVE NEUROLOGICAL SYMPTOMS
no generalized seizures/no focal seizures/no loss of consciousness/no hemiparesis/no difficulty walking/no confusion/no headache/no tremors/no vertigo/no loss of sensation/no paresthesias/no facial palsy/no syncope/no transient paralysis/no weakness

## 2018-02-21 NOTE — H&P CARDIOLOGY - RS GEN PE MLT RESP DETAILS PC
breath sounds equal/no chest wall tenderness/clear to auscultation bilaterally/respirations non-labored/good air movement/airway patent

## 2018-02-21 NOTE — H&P CARDIOLOGY - HISTORY OF PRESENT ILLNESS
51 y/o M w/ PMH of LAINA (2017) and CAD s/p 2 BRADEN to pLAD presents for cardiac catheretization. negative... 51 y/o M w/ PMH of AWSTEMI (2017) and CAD s/p 2 BRADEN to pLAD presents for cardiac catheretization. Pt states that he has been experiencing shortness of breath and chest pain for the past few months. Pt states that his symptoms can happen both at rest and on exertion. Pt with known CAD and will have repeat cardiac angiogram to r/o worsening CAD. Pt denies N/V/D, fevers, chills, cough, palpitations, substernal distress, syncope, orthopnea, nocturnal paroxysmal dyspnea, edema, cyanosis, heart murmurs, varicosities, phlebitis, claudication. 51 y/o M w/ PMH of AWSTEMI (2017) and CAD s/p 2 BRADEN to pLAD presents for cardiac catheretization. Pt states that he has been experiencing shortness of breath and chest pain for the past few months. Pt states that his symptoms can happen both at rest and on exertion. Pt with known CAD including  of RCA w/ collaterals and 70% LCx will have repeat cardiac angiogram to r/o worsening CAD. Pt denies N/V/D, fevers, chills, cough, palpitations, substernal distress, syncope, orthopnea, nocturnal paroxysmal dyspnea, edema, cyanosis, heart murmurs, varicosities, phlebitis, claudication. 51 y/o M w/ PMH of HLD, AWSTEMI (2017) and CAD s/p 2 BRADEN to pLAD presents for cardiac catheretization. Pt states that he has been experiencing shortness of breath and chest pain for the past few months. Pt states that his symptoms can happen both at rest and on exertion. Pt with known CAD including  of RCA w/ collaterals and 70% LCx will have repeat cardiac angiogram to r/o worsening CAD. Pt denies N/V/D, fevers, chills, cough, palpitations, substernal distress, syncope, orthopnea, nocturnal paroxysmal dyspnea, edema, cyanosis, heart murmurs, varicosities, phlebitis, claudication.

## 2018-02-21 NOTE — H&P CARDIOLOGY - PMH
CAD (coronary artery disease)    Smoker    STEMI (ST elevation myocardial infarction)  AWSTEMI 2017 CAD (coronary artery disease)    HLD (hyperlipidemia)    Smoker    STEMI (ST elevation myocardial infarction)  AWSTEMI 2017

## 2018-02-21 NOTE — CHART NOTE - NSCHARTNOTEFT_GEN_A_CORE
PINO LEE 52y Male s/p cath radial site check    Dressing is clear/dry/intact.   Site is without hematoma or bleeding.   Vital Signs Last 24 Hrs  T(C): 36.4 (21 Feb 2018 20:47), Max: 36.4 (21 Feb 2018 20:47)  T(F): 97.5 (21 Feb 2018 20:47), Max: 97.5 (21 Feb 2018 20:47)  HR: 61 (21 Feb 2018 20:47) (61 - 61)  BP: 110/69 (21 Feb 2018 20:47) (110/69 - 110/69)  RR: 16 (21 Feb 2018 20:47) (16 - 16)  SpO2: 100% (21 Feb 2018 20:47) (100% - 100%)    Pulses palpable, cap refill <2 sec.   Patient denies pain, numbness, tingling, CP, SOB. VSS.   Will continue to monitor.

## 2018-02-22 VITALS
RESPIRATION RATE: 17 BRPM | SYSTOLIC BLOOD PRESSURE: 129 MMHG | TEMPERATURE: 98 F | HEART RATE: 64 BPM | OXYGEN SATURATION: 99 % | DIASTOLIC BLOOD PRESSURE: 77 MMHG

## 2018-02-22 LAB
BUN SERPL-MCNC: 12 MG/DL — SIGNIFICANT CHANGE UP (ref 7–23)
CALCIUM SERPL-MCNC: 9 MG/DL — SIGNIFICANT CHANGE UP (ref 8.4–10.5)
CHLORIDE SERPL-SCNC: 102 MMOL/L — SIGNIFICANT CHANGE UP (ref 98–107)
CO2 SERPL-SCNC: 25 MMOL/L — SIGNIFICANT CHANGE UP (ref 22–31)
CREAT SERPL-MCNC: 0.75 MG/DL — SIGNIFICANT CHANGE UP (ref 0.5–1.3)
GLUCOSE SERPL-MCNC: 108 MG/DL — HIGH (ref 70–99)
HCT VFR BLD CALC: 44.9 % — SIGNIFICANT CHANGE UP (ref 39–50)
HGB BLD-MCNC: 14.9 G/DL — SIGNIFICANT CHANGE UP (ref 13–17)
MAGNESIUM SERPL-MCNC: 1.8 MG/DL — SIGNIFICANT CHANGE UP (ref 1.6–2.6)
MCHC RBC-ENTMCNC: 30.5 PG — SIGNIFICANT CHANGE UP (ref 27–34)
MCHC RBC-ENTMCNC: 33.2 % — SIGNIFICANT CHANGE UP (ref 32–36)
MCV RBC AUTO: 92 FL — SIGNIFICANT CHANGE UP (ref 80–100)
NRBC # FLD: 0 — SIGNIFICANT CHANGE UP
PHOSPHATE SERPL-MCNC: 4.2 MG/DL — SIGNIFICANT CHANGE UP (ref 2.5–4.5)
PLATELET # BLD AUTO: 248 K/UL — SIGNIFICANT CHANGE UP (ref 150–400)
PMV BLD: 10.2 FL — SIGNIFICANT CHANGE UP (ref 7–13)
POTASSIUM SERPL-MCNC: 4.2 MMOL/L — SIGNIFICANT CHANGE UP (ref 3.5–5.3)
POTASSIUM SERPL-SCNC: 4.2 MMOL/L — SIGNIFICANT CHANGE UP (ref 3.5–5.3)
RBC # BLD: 4.88 M/UL — SIGNIFICANT CHANGE UP (ref 4.2–5.8)
RBC # FLD: 14.4 % — SIGNIFICANT CHANGE UP (ref 10.3–14.5)
SODIUM SERPL-SCNC: 140 MMOL/L — SIGNIFICANT CHANGE UP (ref 135–145)
WBC # BLD: 9.7 K/UL — SIGNIFICANT CHANGE UP (ref 3.8–10.5)
WBC # FLD AUTO: 9.7 K/UL — SIGNIFICANT CHANGE UP (ref 3.8–10.5)

## 2018-02-22 RX ADMIN — LISINOPRIL 5 MILLIGRAM(S): 2.5 TABLET ORAL at 06:33

## 2018-02-22 RX ADMIN — SODIUM CHLORIDE 3 MILLILITER(S): 9 INJECTION INTRAMUSCULAR; INTRAVENOUS; SUBCUTANEOUS at 13:37

## 2018-02-22 RX ADMIN — Medication 50 MILLIGRAM(S): at 06:33

## 2018-02-22 RX ADMIN — TICAGRELOR 90 MILLIGRAM(S): 90 TABLET ORAL at 06:33

## 2018-02-22 RX ADMIN — SODIUM CHLORIDE 3 MILLILITER(S): 9 INJECTION INTRAMUSCULAR; INTRAVENOUS; SUBCUTANEOUS at 06:32

## 2018-02-22 NOTE — DISCHARGE NOTE ADULT - MEDICATION SUMMARY - MEDICATIONS TO TAKE
I will START or STAY ON the medications listed below when I get home from the hospital:    aspirin 81 mg oral tablet  -- 1 tab(s) by mouth once a day  -- Indication: For CAD (coronary artery disease)    lisinopril 5 mg oral tablet  -- 1 tab(s) by mouth once a day  -- Indication: For Htn    Imdur 30 mg oral tablet, extended release  -- 1 tab(s) by mouth once a day (in the morning)  -- Indication: For CAD (coronary artery disease)    Zetia 10 mg oral tablet  -- 1 tab(s) by mouth once a day  -- Indication: For HLD (hyperlipidemia)    atorvastatin 80 mg oral tablet  -- 1 tab(s) by mouth once a day (at bedtime)  -- Indication: For HLD (hyperlipidemia)    ticagrelor 90 mg oral tablet  -- 1 tab(s) by mouth 2 times a day  -- Indication: For CAD (coronary artery disease)    metoprolol succinate 50 mg oral tablet, extended release  -- 1 tab(s) by mouth once a day  -- Indication: For CAD (coronary artery disease)

## 2018-02-22 NOTE — DISCHARGE NOTE ADULT - HOSPITAL COURSE
53 y/o M w/ PMH of HLD, AWSTEMI (2017) and CAD s/p 2 BRADEN to pLAD presents for cardiac catheretization. Pt states that he has been experiencing shortness of breath and chest pain for the past few months. Pt states that his symptoms can happen both at rest and on exertion. Pt with known CAD including  of RCA w/ collaterals and 70% LCx will have repeat cardiac angiogram to r/o worsening CAD. Left heart cath performed on 2/21 showed LAd stents patent, LCx 95% x1 BRADEN, % , RRA accessed, site stable. Patient cleared for discharge with outpatient follow up.

## 2018-02-22 NOTE — PROGRESS NOTE ADULT - SUBJECTIVE AND OBJECTIVE BOX
Overnight Events:   Patient with very mild chest discomfort with exertion this AM, however better than prior to stent. No other complaints this AM    Review of Systems:  REVIEW OF SYSTEMS:    CONSTITUTIONAL: No weakness, fevers or chills  EYES/ENT: No visual changes;  No dysphagia  NECK: No pain or stiffness  RESPIRATORY: No cough, wheezing, hemoptysis; No shortness of breath  CARDIOVASCULAR: +chest pain, no palpitations; No lower extremity edema  GASTROINTESTINAL: No abdominal or epigastric pain. No nausea, vomiting, or hematemesis; No diarrhea or constipation. No melena or hematochezia.  BACK: No back pain  GENITOURINARY: No dysuria, frequency or hematuria  NEUROLOGICAL: No numbness or weakness  SKIN: No itching, burning, rashes, or lesions   All other review of systems is negative unless indicated above.            Medications:  aspirin enteric coated 81 milliGRAM(s) Oral daily  atorvastatin 80 milliGRAM(s) Oral at bedtime  heparin  Injectable 5000 Unit(s) SubCutaneous every 12 hours  isosorbide   mononitrate ER Tablet (IMDUR) 30 milliGRAM(s) Oral daily  lisinopril 5 milliGRAM(s) Oral daily  metoprolol succinate ER 50 milliGRAM(s) Oral daily  sodium chloride 0.9% lock flush 3 milliLiter(s) IV Push every 8 hours  sodium chloride 0.9%. 500 milliLiter(s) IV Continuous <Continuous>  ticagrelor 90 milliGRAM(s) Oral two times a day      PAST MEDICAL & SURGICAL HISTORY:  HLD (hyperlipidemia)  CAD (coronary artery disease)  STEMI (ST elevation myocardial infarction): AWSTEMI 2017  Smoker  S/P drug eluting coronary stent placement: x2 pLAD      Vitals:  T(F): 98.1 (02-22), Max: 98.1 (02-22)  HR: 62 (02-22) (61 - 62)  BP: 104/59 (02-22) (104/59 - 110/69)  RR: 16 (02-22)  SpO2: 100% (02-22)  I&O's Summary      Physical Exam:  Appearance: No acute distress  Eyes: PERRL, EOMI, pink conjunctiva  HENT: Normal oral mucosa  Cardiovascular: RRR, S1, S2, no murmurs, no edema, no JVD. R radial access site c/d/i, no hematoma, good pulse  Respiratory: Clear to auscultation bilaterally  Gastrointestinal: soft, non-tende  Musculoskeletal: No clubbing; no joint deformity   Neurologic: Non-focal  Lymphatic: No lymphadenopathy  Psychiatry: AAOx3, mood & affect appropriate  Skin: No rashes, ecchymoses, or cyanosis                          14.9   9.70  )-----------( 248      ( 22 Feb 2018 06:30 )             44.9     02-22    140  |  102  |  12  ----------------------------<  108<H>  4.2   |  25  |  0.75    Ca    9.0      22 Feb 2018 06:30  Phos  4.2     02-22  Mg     1.8     02-22

## 2018-02-22 NOTE — DISCHARGE NOTE ADULT - PATIENT PORTAL LINK FT
You can access the [facility], offered by [facility], by registering with the following website: [email or web address]

## 2018-02-22 NOTE — PROGRESS NOTE ADULT - ASSESSMENT
51 y/o M w/ PMH of LAINA RUEDA (2017) and CAD s/p 2 BRADEN to pLAD presented for [facility], found to have  of RCA (similar to prior) and 95% lesion of LCx s/p BRADEN. Right radial access site c/d/i, no hematoma, good pulse.    -continue DAPT, high intensity statin  -continue imdur, lisinopril, toprol XL  -patient OK for d/c today. Plan for outpatient follow up, eventual staged PCI

## 2018-02-22 NOTE — DISCHARGE NOTE ADULT - CARE PROVIDER_API CALL
Lidia Matos (JORGE), Cardiology; Internal Medicine  [id number] [place] [address]  [address]  [place], [place] [id number]  Phone: [phone]  Fax: [phone]

## 2019-03-08 ENCOUNTER — TRANSCRIPTION ENCOUNTER (OUTPATIENT)
Age: 54
End: 2019-03-08

## 2019-03-09 ENCOUNTER — EMERGENCY (EMERGENCY)
Facility: HOSPITAL | Age: 54
LOS: 1 days | Discharge: HOME | End: 2019-03-09
Attending: EMERGENCY MEDICINE | Admitting: EMERGENCY MEDICINE
Payer: MEDICAID

## 2019-03-09 VITALS
DIASTOLIC BLOOD PRESSURE: 83 MMHG | TEMPERATURE: 98 F | SYSTOLIC BLOOD PRESSURE: 126 MMHG | HEART RATE: 120 BPM | OXYGEN SATURATION: 100 % | RESPIRATION RATE: 16 BRPM

## 2019-03-09 VITALS
TEMPERATURE: 98 F | SYSTOLIC BLOOD PRESSURE: 130 MMHG | RESPIRATION RATE: 18 BRPM | DIASTOLIC BLOOD PRESSURE: 80 MMHG | HEART RATE: 98 BPM | OXYGEN SATURATION: 100 %

## 2019-03-09 DIAGNOSIS — Z95.5 PRESENCE OF CORONARY ANGIOPLASTY IMPLANT AND GRAFT: Chronic | ICD-10-CM

## 2019-03-09 PROBLEM — E78.5 HYPERLIPIDEMIA, UNSPECIFIED: Chronic | Status: ACTIVE | Noted: 2018-02-21

## 2019-03-09 PROBLEM — F17.200 NICOTINE DEPENDENCE, UNSPECIFIED, UNCOMPLICATED: Chronic | Status: ACTIVE | Noted: 2018-02-21

## 2019-03-09 PROBLEM — I25.10 ATHEROSCLEROTIC HEART DISEASE OF NATIVE CORONARY ARTERY WITHOUT ANGINA PECTORIS: Chronic | Status: ACTIVE | Noted: 2018-02-21

## 2019-03-09 PROBLEM — I21.3 ST ELEVATION (STEMI) MYOCARDIAL INFARCTION OF UNSPECIFIED SITE: Chronic | Status: ACTIVE | Noted: 2018-02-21

## 2019-03-09 LAB
ALBUMIN SERPL ELPH-MCNC: 3.9 G/DL — SIGNIFICANT CHANGE UP (ref 3.3–5)
ALP SERPL-CCNC: 90 U/L — SIGNIFICANT CHANGE UP (ref 40–120)
ALT FLD-CCNC: 46 U/L — HIGH (ref 4–41)
ANION GAP SERPL CALC-SCNC: 11 MMO/L — SIGNIFICANT CHANGE UP (ref 7–14)
AST SERPL-CCNC: 51 U/L — HIGH (ref 4–40)
BASE EXCESS BLDV CALC-SCNC: 2.2 MMOL/L — SIGNIFICANT CHANGE UP
BASOPHILS # BLD AUTO: 0.02 K/UL — SIGNIFICANT CHANGE UP (ref 0–0.2)
BASOPHILS NFR BLD AUTO: 0.2 % — SIGNIFICANT CHANGE UP (ref 0–2)
BILIRUB SERPL-MCNC: 0.6 MG/DL — SIGNIFICANT CHANGE UP (ref 0.2–1.2)
BLOOD GAS VENOUS - CREATININE: 0.83 MG/DL — SIGNIFICANT CHANGE UP (ref 0.5–1.3)
BUN SERPL-MCNC: 12 MG/DL — SIGNIFICANT CHANGE UP (ref 7–23)
CALCIUM SERPL-MCNC: 9.1 MG/DL — SIGNIFICANT CHANGE UP (ref 8.4–10.5)
CHLORIDE BLDV-SCNC: 107 MMOL/L — SIGNIFICANT CHANGE UP (ref 96–108)
CHLORIDE SERPL-SCNC: 100 MMOL/L — SIGNIFICANT CHANGE UP (ref 98–107)
CO2 SERPL-SCNC: 24 MMOL/L — SIGNIFICANT CHANGE UP (ref 22–31)
CREAT SERPL-MCNC: 0.84 MG/DL — SIGNIFICANT CHANGE UP (ref 0.5–1.3)
EOSINOPHIL # BLD AUTO: 0.11 K/UL — SIGNIFICANT CHANGE UP (ref 0–0.5)
EOSINOPHIL NFR BLD AUTO: 1.2 % — SIGNIFICANT CHANGE UP (ref 0–6)
GAS PNL BLDV: 132 MMOL/L — LOW (ref 136–146)
GLUCOSE BLDV-MCNC: 128 — HIGH (ref 70–99)
GLUCOSE SERPL-MCNC: 117 MG/DL — HIGH (ref 70–99)
HCO3 BLDV-SCNC: 25 MMOL/L — SIGNIFICANT CHANGE UP (ref 20–27)
HCT VFR BLD CALC: 51.1 % — HIGH (ref 39–50)
HCT VFR BLDV CALC: 50.1 % — SIGNIFICANT CHANGE UP (ref 39–51)
HGB BLD-MCNC: 16.3 G/DL — SIGNIFICANT CHANGE UP (ref 13–17)
HGB BLDV-MCNC: 16.4 G/DL — SIGNIFICANT CHANGE UP (ref 13–17)
IMM GRANULOCYTES NFR BLD AUTO: 0.3 % — SIGNIFICANT CHANGE UP (ref 0–1.5)
LACTATE BLDV-MCNC: 1.2 MMOL/L — SIGNIFICANT CHANGE UP (ref 0.5–2)
LYMPHOCYTES # BLD AUTO: 1.32 K/UL — SIGNIFICANT CHANGE UP (ref 1–3.3)
LYMPHOCYTES # BLD AUTO: 14 % — SIGNIFICANT CHANGE UP (ref 13–44)
MCHC RBC-ENTMCNC: 29.1 PG — SIGNIFICANT CHANGE UP (ref 27–34)
MCHC RBC-ENTMCNC: 31.9 % — LOW (ref 32–36)
MCV RBC AUTO: 91.1 FL — SIGNIFICANT CHANGE UP (ref 80–100)
MONOCYTES # BLD AUTO: 0.53 K/UL — SIGNIFICANT CHANGE UP (ref 0–0.9)
MONOCYTES NFR BLD AUTO: 5.6 % — SIGNIFICANT CHANGE UP (ref 2–14)
NEUTROPHILS # BLD AUTO: 7.42 K/UL — HIGH (ref 1.8–7.4)
NEUTROPHILS NFR BLD AUTO: 78.7 % — HIGH (ref 43–77)
NRBC # FLD: 0 K/UL — LOW (ref 25–125)
PCO2 BLDV: 46 MMHG — SIGNIFICANT CHANGE UP (ref 41–51)
PH BLDV: 7.39 PH — SIGNIFICANT CHANGE UP (ref 7.32–7.43)
PLATELET # BLD AUTO: 248 K/UL — SIGNIFICANT CHANGE UP (ref 150–400)
PMV BLD: 10.2 FL — SIGNIFICANT CHANGE UP (ref 7–13)
PO2 BLDV: 35 MMHG — SIGNIFICANT CHANGE UP (ref 35–40)
POTASSIUM BLDV-SCNC: 3.8 MMOL/L — SIGNIFICANT CHANGE UP (ref 3.4–4.5)
POTASSIUM SERPL-MCNC: 5.7 MMOL/L — HIGH (ref 3.5–5.3)
POTASSIUM SERPL-SCNC: 5.7 MMOL/L — HIGH (ref 3.5–5.3)
PROT SERPL-MCNC: 8 G/DL — SIGNIFICANT CHANGE UP (ref 6–8.3)
RBC # BLD: 5.61 M/UL — SIGNIFICANT CHANGE UP (ref 4.2–5.8)
RBC # FLD: 14.6 % — HIGH (ref 10.3–14.5)
SAO2 % BLDV: 61.1 % — SIGNIFICANT CHANGE UP (ref 60–85)
SODIUM SERPL-SCNC: 135 MMOL/L — SIGNIFICANT CHANGE UP (ref 135–145)
TROPONIN T, HIGH SENSITIVITY: < 6 NG/L — SIGNIFICANT CHANGE UP (ref ?–14)
WBC # BLD: 9.43 K/UL — SIGNIFICANT CHANGE UP (ref 3.8–10.5)
WBC # FLD AUTO: 9.43 K/UL — SIGNIFICANT CHANGE UP (ref 3.8–10.5)

## 2019-03-09 PROCEDURE — 73140 X-RAY EXAM OF FINGER(S): CPT | Mod: 26,LT

## 2019-03-09 PROCEDURE — 99285 EMERGENCY DEPT VISIT HI MDM: CPT

## 2019-03-09 PROCEDURE — 71045 X-RAY EXAM CHEST 1 VIEW: CPT | Mod: 26

## 2019-03-09 RX ORDER — MORPHINE SULFATE 50 MG/1
4 CAPSULE, EXTENDED RELEASE ORAL ONCE
Qty: 0 | Refills: 0 | Status: DISCONTINUED | OUTPATIENT
Start: 2019-03-09 | End: 2019-03-09

## 2019-03-09 RX ORDER — AMPICILLIN SODIUM AND SULBACTAM SODIUM 250; 125 MG/ML; MG/ML
3 INJECTION, POWDER, FOR SUSPENSION INTRAMUSCULAR; INTRAVENOUS ONCE
Qty: 0 | Refills: 0 | Status: COMPLETED | OUTPATIENT
Start: 2019-03-09 | End: 2019-03-09

## 2019-03-09 RX ORDER — MORPHINE SULFATE 50 MG/1
2 CAPSULE, EXTENDED RELEASE ORAL ONCE
Qty: 0 | Refills: 0 | Status: DISCONTINUED | OUTPATIENT
Start: 2019-03-09 | End: 2019-03-09

## 2019-03-09 RX ADMIN — MORPHINE SULFATE 4 MILLIGRAM(S): 50 CAPSULE, EXTENDED RELEASE ORAL at 18:14

## 2019-03-09 RX ADMIN — MORPHINE SULFATE 4 MILLIGRAM(S): 50 CAPSULE, EXTENDED RELEASE ORAL at 18:41

## 2019-03-09 RX ADMIN — MORPHINE SULFATE 4 MILLIGRAM(S): 50 CAPSULE, EXTENDED RELEASE ORAL at 15:50

## 2019-03-09 RX ADMIN — MORPHINE SULFATE 4 MILLIGRAM(S): 50 CAPSULE, EXTENDED RELEASE ORAL at 15:11

## 2019-03-09 RX ADMIN — AMPICILLIN SODIUM AND SULBACTAM SODIUM 200 GRAM(S): 250; 125 INJECTION, POWDER, FOR SUSPENSION INTRAMUSCULAR; INTRAVENOUS at 14:51

## 2019-03-09 NOTE — ED PROVIDER NOTE - NSFOLLOWUPINSTRUCTIONS_ED_ALL_ED_FT
CALL MONDAY TO FOLLOW UP WITH DR. NAILS [phone]    CONTINUE TAKING AUGMENTIN AS PRESCRIBED    RETURN TO ED IF SYMPTOMS WORSEN OR YOU DEVELOP FEVER/CHILLS/WEAKNESS

## 2019-03-09 NOTE — ED ADULT NURSE NOTE - PMH
CAD (coronary artery disease)    HLD (hyperlipidemia)    Smoker    STEMI (ST elevation myocardial infarction)  AWSTEMI 2017

## 2019-03-09 NOTE — ED ADULT NURSE NOTE - OBJECTIVE STATEMENT
Patient to room 3 with c/o left middle finger pain, swelling and hardness around nail bed. Pt c/o pain 7/10 and is being evaluated by MD. IVL placed to right AC 20 gauge and labs drawn and sent. Waiting for results, further orders and disposition.   MIGUEL Becerra Patient to room 3 with c/o left middle finger pain, swelling and hardness around nail bed. Pt c/o pain 7/10 and is being evaluated by MD. IVL placed to right AC 20 gauge and labs drawn and sent. Waiting for results, further orders and disposition.   MIGUEL Becerra  Hand Surgery to see patient. Finger drained and repaired. Pt discharged with instructions. IVL dc'd and patient left with personal belongings and verbalized understanding of instructions.  MIGUEL Becerra

## 2019-03-09 NOTE — ED ADULT NURSE NOTE - NSIMPLEMENTINTERV_GEN_ALL_ED
Implemented All Universal Safety Interventions:  [place] to call system. Call bell, personal items and telephone within reach. Instruct patient to call for assistance. Room bathroom lighting operational. Non-slip footwear when patient is off stretcher. Physically safe environment: no spills, clutter or unnecessary equipment. Stretcher in lowest position, wheels locked, appropriate side rails in place.

## 2019-03-09 NOTE — PROGRESS NOTE ADULT - SUBJECTIVE AND OBJECTIVE BOX
Paronychia left middle finger  drained bedside    Warm soaks TID  Bacitracin and dry gauze  Elevation  FU 1 week  [phone]

## 2019-03-09 NOTE — ED PROVIDER NOTE - ATTENDING CONTRIBUTION TO CARE
52 yo male with infection distal left middle finger over nail bed, had PO antibiotics, now finger pad swollen and tender with difficulty flexing DIP joint. Exam : pt has felon, distended tender finger pad left middle finger vola pad. Plan I and D hand consult

## 2019-03-09 NOTE — ED PROVIDER NOTE - CLINICAL SUMMARY MEDICAL DECISION MAKING FREE TEXT BOX
52yo M pmhx HTN DM p/w CC finger pain likely c/w jenny, also episode of chest pain, will send labs trop vbg give IV abx and consult hand

## 2019-03-09 NOTE — ED ADULT TRIAGE NOTE - CHIEF COMPLAINT QUOTE
Pt c/o infection to middle finger of the Lt hand, pt states also feeling palpitations, accompanied with sob, breathing even and mildly labored, denies headache/dizziness.

## 2019-03-09 NOTE — ED PROVIDER NOTE - OBJECTIVE STATEMENT
54yo M pmhx HTN DM p/w CC L third finger infection for past week, was seen at urgent care and [facility], has tried keflex and augmentin without improvement. Denies trauma or inciting event. Also states today he had an episode of chest pain and SOB and doesn't feel right. Denies fever chills cough n/v/d.

## 2019-03-11 LAB — SPECIMEN SOURCE: SIGNIFICANT CHANGE UP

## 2019-03-12 LAB
METHOD TYPE: SIGNIFICANT CHANGE UP
ORGANISM # SPEC MICROSCOPIC CNT: SIGNIFICANT CHANGE UP

## 2019-03-12 RX ORDER — AZTREONAM 2 G
1 VIAL (EA) INJECTION
Qty: 20 | Refills: 0 | OUTPATIENT
Start: 2019-03-12 | End: 2019-03-21

## 2019-03-12 NOTE — ED POST DISCHARGE NOTE - RESULT SUMMARY
WCX: Staph aureus and staph lugdenesis. Patient on Augmentin which is resistant.  Discussed with patient WCX results. switched to Bactrim DS 1 tab BID X 10 days. Discussed with patient need to return to ED if symptoms don't continue to improve or recur or develops any new or worsening symptoms that are of concern. Discussed with patient to follow up with MD.

## 2019-03-13 LAB
-  CEFAZOLIN: SIGNIFICANT CHANGE UP
-  CEFAZOLIN: SIGNIFICANT CHANGE UP
-  CIPROFLOXACIN: SIGNIFICANT CHANGE UP
-  CIPROFLOXACIN: SIGNIFICANT CHANGE UP
-  CLINDAMYCIN: SIGNIFICANT CHANGE UP
-  CLINDAMYCIN: SIGNIFICANT CHANGE UP
-  ERYTHROMYCIN: SIGNIFICANT CHANGE UP
-  ERYTHROMYCIN: SIGNIFICANT CHANGE UP
-  GENTAMICIN: SIGNIFICANT CHANGE UP
-  GENTAMICIN: SIGNIFICANT CHANGE UP
-  LEVOFLOXACIN: SIGNIFICANT CHANGE UP
-  LEVOFLOXACIN: SIGNIFICANT CHANGE UP
-  MOXIFLOXACIN(AEROBIC): SIGNIFICANT CHANGE UP
-  MOXIFLOXACIN(AEROBIC): SIGNIFICANT CHANGE UP
-  OXACILLIN: SIGNIFICANT CHANGE UP
-  OXACILLIN: SIGNIFICANT CHANGE UP
-  PENICILLIN: SIGNIFICANT CHANGE UP
-  PENICILLIN: SIGNIFICANT CHANGE UP
-  RIFAMPIN.: SIGNIFICANT CHANGE UP
-  RIFAMPIN.: SIGNIFICANT CHANGE UP
-  TETRACYCLINE: SIGNIFICANT CHANGE UP
-  TETRACYCLINE: SIGNIFICANT CHANGE UP
-  TRIMETHOPRIM/SULFAMETHOXAZOLE: SIGNIFICANT CHANGE UP
-  TRIMETHOPRIM/SULFAMETHOXAZOLE: SIGNIFICANT CHANGE UP
-  VANCOMYCIN: SIGNIFICANT CHANGE UP
-  VANCOMYCIN: SIGNIFICANT CHANGE UP
BACTERIA WND CULT: SIGNIFICANT CHANGE UP
METHOD TYPE: SIGNIFICANT CHANGE UP

## 2022-10-13 PROBLEM — I51.9 SEVERE LEFT VENTRICULAR SYSTOLIC DYSFUNCTION: Status: ACTIVE | Noted: 2017-08-30

## 2024-01-12 NOTE — DISCHARGE NOTE ADULT - CARE PLAN
Principal Discharge DX:	S/P drug eluting coronary stent placement  Goal:	prevent future occurrences  Assessment and plan of treatment:	Continue aspirin and Plavix, do not stop unless instructed by physician. Follow up with cardiologist in 1 week. Continue low cholesterol diet. Do not smoke, or drink alcohol.  Secondary Diagnosis:	CAD (coronary artery disease)  Assessment and plan of treatment:	No strenuous activity for 3 weeks. No heavy lifting > 5-10 pound for 1 week. No driving for 24 hours. Monitor site of procedure for bleeding pain, swelling, or discharge. Notify MD if any symptoms occur. You may shower , no baths or swimming for one week after procedure.  Secondary Diagnosis:	HLD (hyperlipidemia)  Assessment and plan of treatment:	Continue with medications as prescribed. Decrease the total number of fat you eat, include fish in your diet, eat foods with high fiber. Limit alcohol and do not smoke. Maintain a healthy weight and exercise regularly. Follow up with primary care provider in 1 week.  Secondary Diagnosis:	Smoker  Assessment and plan of treatment:	Please follow up with PCP. Limit smoking. None known

## 2024-09-04 NOTE — ED PROVIDER NOTE - CPE EDP SKIN NORM
normal...